# Patient Record
Sex: FEMALE | Race: BLACK OR AFRICAN AMERICAN | NOT HISPANIC OR LATINO | Employment: UNEMPLOYED | URBAN - METROPOLITAN AREA
[De-identification: names, ages, dates, MRNs, and addresses within clinical notes are randomized per-mention and may not be internally consistent; named-entity substitution may affect disease eponyms.]

---

## 2023-12-14 DIAGNOSIS — M25.562 ACUTE PAIN OF LEFT KNEE: Primary | ICD-10-CM

## 2023-12-14 DIAGNOSIS — M17.0 PRIMARY OSTEOARTHRITIS OF BOTH KNEES: ICD-10-CM

## 2024-01-02 ENCOUNTER — HOSPITAL ENCOUNTER (OUTPATIENT)
Dept: RADIOLOGY | Facility: HOSPITAL | Age: 59
Discharge: HOME | End: 2024-01-02
Attending: NURSE PRACTITIONER
Payer: MEDICAID

## 2024-01-02 ENCOUNTER — OFFICE VISIT (OUTPATIENT)
Dept: ORTHOPEDICS | Facility: CLINIC | Age: 59
End: 2024-01-02
Payer: MEDICAID

## 2024-01-02 VITALS
WEIGHT: 197.19 LBS | DIASTOLIC BLOOD PRESSURE: 98 MMHG | HEART RATE: 81 BPM | RESPIRATION RATE: 18 BRPM | HEIGHT: 64 IN | BODY MASS INDEX: 33.66 KG/M2 | SYSTOLIC BLOOD PRESSURE: 152 MMHG | TEMPERATURE: 98 F | OXYGEN SATURATION: 98 %

## 2024-01-02 DIAGNOSIS — M17.12 PRIMARY OSTEOARTHRITIS OF LEFT KNEE: Primary | ICD-10-CM

## 2024-01-02 DIAGNOSIS — M25.562 ACUTE PAIN OF LEFT KNEE: ICD-10-CM

## 2024-01-02 PROCEDURE — 3008F BODY MASS INDEX DOCD: CPT | Mod: CPTII,,, | Performed by: NURSE PRACTITIONER

## 2024-01-02 PROCEDURE — 99214 OFFICE O/P EST MOD 30 MIN: CPT | Mod: PBBFAC,25 | Performed by: NURSE PRACTITIONER

## 2024-01-02 PROCEDURE — 73564 X-RAY EXAM KNEE 4 OR MORE: CPT | Mod: TC,LT

## 2024-01-02 PROCEDURE — 99204 OFFICE O/P NEW MOD 45 MIN: CPT | Mod: 25,S$PBB,, | Performed by: NURSE PRACTITIONER

## 2024-01-02 PROCEDURE — 3080F DIAST BP >= 90 MM HG: CPT | Mod: CPTII,,, | Performed by: NURSE PRACTITIONER

## 2024-01-02 PROCEDURE — 1160F RVW MEDS BY RX/DR IN RCRD: CPT | Mod: CPTII,,, | Performed by: NURSE PRACTITIONER

## 2024-01-02 PROCEDURE — 1159F MED LIST DOCD IN RCRD: CPT | Mod: CPTII,,, | Performed by: NURSE PRACTITIONER

## 2024-01-02 PROCEDURE — 20610 DRAIN/INJ JOINT/BURSA W/O US: CPT | Mod: PBBFAC | Performed by: NURSE PRACTITIONER

## 2024-01-02 PROCEDURE — 3077F SYST BP >= 140 MM HG: CPT | Mod: CPTII,,, | Performed by: NURSE PRACTITIONER

## 2024-01-02 RX ORDER — DICLOFENAC SODIUM 10 MG/G
GEL TOPICAL
COMMUNITY
Start: 2023-08-30

## 2024-01-02 RX ORDER — LIDOCAINE HYDROCHLORIDE 10 MG/ML
5 INJECTION, SOLUTION EPIDURAL; INFILTRATION; INTRACAUDAL; PERINEURAL
Status: COMPLETED | OUTPATIENT
Start: 2024-01-02 | End: 2024-01-02

## 2024-01-02 RX ORDER — SERTRALINE HYDROCHLORIDE 50 MG/1
50 TABLET, FILM COATED ORAL
COMMUNITY
Start: 2023-11-28

## 2024-01-02 RX ORDER — TRIAMCINOLONE ACETONIDE 40 MG/ML
40 INJECTION, SUSPENSION INTRA-ARTICULAR; INTRAMUSCULAR
Status: COMPLETED | OUTPATIENT
Start: 2024-01-02 | End: 2024-01-02

## 2024-01-02 RX ORDER — LATANOPROST 50 UG/ML
1 SOLUTION/ DROPS OPHTHALMIC NIGHTLY
COMMUNITY
Start: 2023-12-20

## 2024-01-02 RX ORDER — OLMESARTAN MEDOXOMIL 20 MG/1
20 TABLET ORAL
COMMUNITY
Start: 2023-12-31

## 2024-01-02 RX ORDER — BRIMONIDINE TARTRATE, TIMOLOL MALEATE 2; 5 MG/ML; MG/ML
1 SOLUTION/ DROPS OPHTHALMIC 2 TIMES DAILY
COMMUNITY
Start: 2023-12-13

## 2024-01-02 RX ADMIN — LIDOCAINE HYDROCHLORIDE 5 ML: 10 INJECTION, SOLUTION EPIDURAL; INFILTRATION; INTRACAUDAL; PERINEURAL at 12:01

## 2024-01-02 RX ADMIN — TRIAMCINOLONE ACETONIDE 40 MG: 40 INJECTION, SUSPENSION INTRA-ARTICULAR; INTRAMUSCULAR at 12:01

## 2024-01-02 NOTE — PROGRESS NOTES
"  Subjective:   PATIENT ID: Annamarie Blackmon is a 58 y.o. female. Non-smoker. Employment HX: teacher, currently employed.    Seen [facility] ortho for same DX since n/a.   CHIEF COMPLAINT: Pain and Swelling of the Left Knee (Left knee pain x2 months, pain 6/10 at  present takes tylenol arthritis as needed for pain)    HPI:    Left aching medial knee pain.   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  Onset: several years ago fluctuates   Modifying Factors: worse with activity, improves with rest, stiffness after immobilization, and improves with less than 30 minutes activity  Associated Symptoms: crepitus, decreased ROM, and swelling   Activity: sedentary with light activity and pain moderately interferes with ADLs   Previous Treatments:  OTC pain relievers: acetaminophen, ibuprofen   and RX medications: topical diclofenac  without symptom relief  PMH: negative for contraindications for NSAID use  Family History: + OA    NOTE: New patient referred for left knee pain with limited conservative treatments.  Symptoms affecting ADLs and ability to work.     Current Outpatient Medications:     COMBIGAN 0.2-0.5 % Drop, Place 1 drop into both eyes 2 (two) times daily., Disp: , Rfl:     latanoprost 0.005 % ophthalmic solution, Place 1 drop into both eyes every evening., Disp: , Rfl:     olmesartan (BENICAR) 20 MG tablet, Take 20 mg by mouth., Disp: , Rfl:     sertraline (ZOLOFT) 50 MG tablet, Take 50 mg by mouth., Disp: , Rfl:     diclofenac sodium (VOLTAREN) 1 % Gel, Apply topically., Disp: , Rfl:   Review of patient's allergies indicates:   Allergen Reactions    Codeine Blisters     No results found for: "HGBA1C"   Body mass index is 33.85 kg/m².   Vitals:    01/02/24 1239   BP: (!) 152/98   Pulse: 81   Resp: 18   Temp: 97.8 °F (36.6 °C)   SpO2: 98%   Weight: 89.4 kg (197 lb 3.2 oz)   Height: 5' 4" (1.626 m)   PainSc:   6      REVIEW OF SYSTEMS:  A ten-point review of systems was performed and is " negative, except as mentioned above   Objective:   MSK Knee Exam  General:  no apparent distress, no pain indicators,  obesity  Inspection:   lower extremities in proportion with overall body habitus, no erythema   , limping gait w/ full weight bearing   LEFT KNEE RIGHT KNEE   moderate knee effusion,  no varus deformity, no contusion, no masses, no scars no knee effusion, normal alignment, no contusion, no masses, no scars   Palpation:     LEFT KNEE RIGHT KNEE   normal temperature, noted tenderness over the medial joint line, noted patellar grind with patella compression and mild patellar facet pain, mild  crepitus, mild quad deconditioning, no active mechanical locking noted w/ joint movement,  no tenderness of patellar tendon or tibial plateau, noted fullness noted to popliteal bursa  normal temperature, no tenderness noted w/ palpation, no patellar grind with patella compression and no patellar facet pain, no crepitus, no quad deconditioning, no active mechanical locking noted w/ joint movement,  no tenderness of patellar tendon or tibial plateau, no fullness noted to popliteal bursa      ROM Active Flexion / Extension (0-140)  Left 3 / 113 w/ pain Right 0 / 135 w/o pain   Strength Flexion / Extension (5 / 5)  Left 4 / 4 Right 5 / 5     Special Testing:         IT Band Syndrome L+ L-- R+ R-- Not Tested    Kg's Test [] [x] [] [x] []    Joint Effusion         Ballotable Effusion [x] [] [] [x] []    Fluid Wave [x] [] [] [x] []    Patellar Testing         Apprehension [] [x] [] [x] []    Meniscal Injury         Fabiola's Test [x] [] [] [x] []    Thessaly's Test [] [] [] [] [x]    Ligament Injury         ACL Anterior Drawer [] [x] [] [x] []    PCL Posterior Drawer [] [x] [] [x] []    LCL Varus Test [] [x] [] [x] []    MCL Valgus Test [] [x] [] [x] []      Hip Exam normal  Ankle Exam normal  Neurovascular: Intact to light touch  Neuro/ Psych: Awake, alert, oriented, normal mood and affect  Lymphatic: No  LAD  Assessment:   IMAGING: X-ray 4 views of left knee ordered, reviewed and independently interpreted by me. Discussed with patient. Noted no acute findings, DJD noted, awaiting radiologist findings    EMR REVIEW: completed with noted Referral documentation reviewed    DIAGNOSIS:  1. Primary osteoarthritis of left knee    2. BMI 33.0-33.9,adult       Plan:     Orders Placed This Encounter    X-Ray Knee Complete 4 or More Views Left     Ongoing education about DX and treatment recommendations including conservative treatments of daily HEP with TheraBand, BMI reduction goal 5-10% of body weight (180# overall goal ), muscle strengthening with use of stationary bike (RPM set at 80 or > with slow progression to goal of 40 minutes 3-4 times per week as tolerated), adequate vit D/C, glucosamine 1500 mg/day and daily acetaminophen 1000 mg 3 times/ day if able to tolerate.    Treatment plan: Moderate exacerbation of chronic Left Mild-to-moderate knee arthritis complicated by possible internal derangement Recommend starting initial conservative treatments including: soft knee splint, HEP with TheraBand > 6 weeks, CSI, and drainage.  If inadequate at f/u will consider formal RX PT.   Procedure: CSI v. VS injections discussed, s/t failed conservative treatments patient consents to CSI today & drainage  RX Medications: continue medications as RX per PCP.  RTC: 4 months  NOTE: None       Gwendolyn Carlsonsruddy [facility]  Procedure Note:   Large Joint Aspiration/Injection: L knee    Date/Time: 1/2/2024 12:20 PM    Performed by: Gwendolyn Esqueda NP  Authorized by: Gwendolyn Esqueda NP    Location:  Knee  Site:  L knee  Medications:  5 mL LIDOCAINE 1% (PF) 50 mg / 5 mL; 5 mL LIDOCAINE 1% (PF) 50 mg / 5 mL; 40 mg KENALOG 40 mg / 1 mL     Ortho Procedure Note   Procedure: LEFT Knee CSI lateral suprapatellar approach     Indications: Therapeutic Indication - Decrease pain, Increase range  of motion and improve quality of life:   Risks:  Possible complications with the injection include bleeding, infection (.01%), tendon rupture, steroid flare, fat pad or soft tissue atrophy, skin depigmentation, allergic reaction to medications and vasovagal response. (steroid flare treatment is rest, ice, NSAIDs and resolves in 24-36 hours)  Consent:  Procedure, risks, benefits, and alternatives explained the patient, who voiced understanding and agreed to proceed with procedure.  Consent signed and scanned into the medical record.   No absolute contraindications (cellulitis overlying joint, infection, lack of informed consent, allergy to injection medication, AVN protein or egg allergy for sodium hyaluronate, or history of steroid flare) or relative contraindications (uncontrolled DM2 A1c>10, coagulopathy, INR > 3.5, previous joint replacement or history of AVN).        Staff: Gwendolyn LEMOS  Description:  Time-out performed.  The patient was prepped in normal sterile fashion use of chlorhexidine scrub and the appropriate and anatomic landmarks were identified.  Sterile 21 gauge 1.5 inch  was used. Topical ethyl chloride was applied. Contents of syringe included:     LEFT KNEE: 5 ml of 1% of lidocaine (PF) pre-drainage, 5 ml of 1% of lidocaine (PF) with 40 mg of Kenalog post-drainage      Drainage: 20 ml clear yellow fluid removed from joint   Complications: None   EBL: None   Post Procedure: Patient alert, and moving all extremities. ROM improved, pain decreased.  Good peripheral pulses, no signs of vascular compromise and range of motion intact.  Aftercare instructions were given to patient at time of discharge.  Relative rest for 3 days-avoiding excess activity.  Place ice on the area for 15 minutes every 4-6 hours. Patient may take Tylenol a 1000 mg b.i.d. or ibuprofen 600 mg t.i.d. for the next 3-4 days if not on medication already and safe to take pending co-morbidities.  Protect the area for the  next 1-8 hours if anesthetic was used.  Avoid excessive activity for the next 3-4 weeks.  ER precautions given for fever, severe joint pain or allergic reaction or other new symptoms related to the joint injection.         Time Based Billing   Total Time Spent with Patient: 45 minutes Excludes Time Spent Performing Procedure  Visit Start Time: 1310  10 minutes spent prior to visit reviewing EMR, prior labs and x-rays.  25 minutes spent in visit with patient face-to-face time completing exam, obtaining history, educating on DX and treatment plan.  10 minutes spent after visit completing EMR documentation.   Visit End Time: 7505    Please be aware that this note has been generated with the assistance of [facility] voice-to-text.  Please excuse any spelling or grammatical errors.

## 2024-01-19 DIAGNOSIS — H40.10X0 OPEN-ANGLE GLAUCOMA, UNSPECIFIED GLAUCOMA STAGE, UNSPECIFIED LATERALITY, UNSPECIFIED OPEN-ANGLE GLAUCOMA TYPE: Primary | ICD-10-CM

## 2024-04-23 ENCOUNTER — OFFICE VISIT (OUTPATIENT)
Dept: OPHTHALMOLOGY | Facility: CLINIC | Age: 59
End: 2024-04-23
Payer: MEDICAID

## 2024-04-23 ENCOUNTER — TELEPHONE (OUTPATIENT)
Dept: ORTHOPEDICS | Facility: CLINIC | Age: 59
End: 2024-04-23
Payer: MEDICAID

## 2024-04-23 VITALS — BODY MASS INDEX: 33.12 KG/M2 | HEIGHT: 64 IN | WEIGHT: 194 LBS

## 2024-04-23 DIAGNOSIS — H40.10X0 OPEN-ANGLE GLAUCOMA, UNSPECIFIED GLAUCOMA STAGE, UNSPECIFIED LATERALITY, UNSPECIFIED OPEN-ANGLE GLAUCOMA TYPE: ICD-10-CM

## 2024-04-23 DIAGNOSIS — H40.053 BILATERAL OCULAR HYPERTENSION: Primary | ICD-10-CM

## 2024-04-23 DIAGNOSIS — H25.13 NUCLEAR SCLEROSIS OF BOTH EYES: ICD-10-CM

## 2024-04-23 PROCEDURE — 92133 CPTRZD OPH DX IMG PST SGM ON: CPT | Mod: PBBFAC,PN | Performed by: OPHTHALMOLOGY

## 2024-04-23 PROCEDURE — 92133 CPTRZD OPH DX IMG PST SGM ON: CPT | Mod: PBBFAC,PN

## 2024-04-23 PROCEDURE — 99213 OFFICE O/P EST LOW 20 MIN: CPT | Mod: PBBFAC,PN,25

## 2024-04-23 PROCEDURE — 92083 EXTENDED VISUAL FIELD XM: CPT | Mod: PBBFAC,PN | Performed by: OPHTHALMOLOGY

## 2024-04-23 PROCEDURE — 92083 EXTENDED VISUAL FIELD XM: CPT | Mod: PBBFAC,PN

## 2024-04-23 RX ORDER — BRIMONIDINE TARTRATE, TIMOLOL MALEATE 2; 5 MG/ML; MG/ML
1 SOLUTION/ DROPS OPHTHALMIC 2 TIMES DAILY
Qty: 5 ML | Refills: 11 | Status: SHIPPED | OUTPATIENT
Start: 2024-04-23

## 2024-04-23 RX ORDER — LATANOPROST 50 UG/ML
1 SOLUTION/ DROPS OPHTHALMIC NIGHTLY
Qty: 2.5 ML | Refills: 11 | Status: SHIPPED | OUTPATIENT
Start: 2024-04-23

## 2024-04-23 NOTE — PROGRESS NOTES
HPI    OCT, IOP and OCT  Referred from [facility]  Fluro drops give irritation   Last edited by Li Caceres MA on 4/23/2024  8:19 AM.            Assessment /Plan     For exam results, see Encounter Report.    Bilateral ocular hypertension    Open-angle glaucoma, unspecified glaucoma stage, unspecified laterality, unspecified open-angle glaucoma type    Nuclear sclerosis of both eyes           OCT RNFL  04/23/24  OD: 95 all green  OS: 112 white S rest green    HVF 24-2  04/23/24  OD: FL 0/10, 3% FP, 0% FN, reliable with ?SNS  OS: FL 3/11, 12% FP, 0% FN, mostly reliable and full    OCT mac   04/23/24: NFC no SRF/IRF OU        OHTN OU  - Referred from [facility], was followed as low risk glaucoma suspect  - on Combigan BID OU, Latanoprost QHS OU  - S/p SLT OU in 2021  - endorses compliance with drops, but missed combigan this AM  - IOP at presentation 30 // 25  - CDR 0.5 // 0.4 with healthy rim OU  - OCT RNFL all green OD // white S rest green OS   - HVF full with ?SNS OD // borderline reliable and full OS  - no correlating thinning on OCT or fundus exam, unlikely to be glaucomatous  - will need CCT, gonioscopy at next appt  - ctm on current therapy, IOP check in 2 months. 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 Phan in 2018  - on refresh drops PRN, doing well, K's clear  - monitor      Combined cataracts OU  - NVS  - 20/20 // 20/30 uncorrected  - monitor        RTC 2 months IOP, CCT, Gonio

## 2024-04-23 NOTE — LETTER
April 23, 2024      [facility]  [address] [place] [address]  Phone: [phone]       Patient: Annamarie Blackmon   YOB: 1965  Date of Visit: 04/23/2024    To Whom It May Concern:    Maria T Blackmon  was at [facility] on 04/23/2024. The patient may return to work/school on 04/24/24 with no restrictions. If you have any questions or concerns, or if I can be of further assistance, please do not hesitate to contact me.    Sincerely,    Lico Perez MD

## 2024-04-23 NOTE — TELEPHONE ENCOUNTER
Patient called stating that she is in a lot pain and having swelling would like to come in to be seen earlier if she can.    Moved patient to come in on 4/29/24

## 2024-04-29 ENCOUNTER — OFFICE VISIT (OUTPATIENT)
Dept: ORTHOPEDICS | Facility: CLINIC | Age: 59
End: 2024-04-29
Payer: MEDICAID

## 2024-04-29 ENCOUNTER — TELEPHONE (OUTPATIENT)
Dept: ORTHOPEDICS | Facility: CLINIC | Age: 59
End: 2024-04-29

## 2024-04-29 VITALS
HEART RATE: 78 BPM | SYSTOLIC BLOOD PRESSURE: 123 MMHG | DIASTOLIC BLOOD PRESSURE: 83 MMHG | TEMPERATURE: 98 F | BODY MASS INDEX: 33.2 KG/M2 | HEIGHT: 64 IN | WEIGHT: 194.44 LBS

## 2024-04-29 DIAGNOSIS — M25.462 EFFUSION OF LEFT KNEE: ICD-10-CM

## 2024-04-29 DIAGNOSIS — M17.12 PRIMARY OSTEOARTHRITIS OF LEFT KNEE: Primary | ICD-10-CM

## 2024-04-29 DIAGNOSIS — M23.92 INTERNAL DERANGEMENT OF LEFT KNEE: ICD-10-CM

## 2024-04-29 PROCEDURE — 3079F DIAST BP 80-89 MM HG: CPT | Mod: CPTII,,, | Performed by: NURSE PRACTITIONER

## 2024-04-29 PROCEDURE — 99214 OFFICE O/P EST MOD 30 MIN: CPT | Mod: PBBFAC,25 | Performed by: NURSE PRACTITIONER

## 2024-04-29 PROCEDURE — 4010F ACE/ARB THERAPY RXD/TAKEN: CPT | Mod: CPTII,,, | Performed by: NURSE PRACTITIONER

## 2024-04-29 PROCEDURE — 1160F RVW MEDS BY RX/DR IN RCRD: CPT | Mod: CPTII,,, | Performed by: NURSE PRACTITIONER

## 2024-04-29 PROCEDURE — 99214 OFFICE O/P EST MOD 30 MIN: CPT | Mod: 25,S$PBB,, | Performed by: NURSE PRACTITIONER

## 2024-04-29 PROCEDURE — 3008F BODY MASS INDEX DOCD: CPT | Mod: CPTII,,, | Performed by: NURSE PRACTITIONER

## 2024-04-29 PROCEDURE — 1159F MED LIST DOCD IN RCRD: CPT | Mod: CPTII,,, | Performed by: NURSE PRACTITIONER

## 2024-04-29 PROCEDURE — 20610 DRAIN/INJ JOINT/BURSA W/O US: CPT | Mod: PBBFAC | Performed by: NURSE PRACTITIONER

## 2024-04-29 PROCEDURE — 3074F SYST BP LT 130 MM HG: CPT | Mod: CPTII,,, | Performed by: NURSE PRACTITIONER

## 2024-04-29 RX ORDER — TRIAMCINOLONE ACETONIDE 40 MG/ML
40 INJECTION, SUSPENSION INTRA-ARTICULAR; INTRAMUSCULAR
Status: COMPLETED | OUTPATIENT
Start: 2024-04-29 | End: 2024-04-29

## 2024-04-29 RX ORDER — LIDOCAINE HYDROCHLORIDE 10 MG/ML
5 INJECTION, SOLUTION EPIDURAL; INFILTRATION; INTRACAUDAL; PERINEURAL
Status: COMPLETED | OUTPATIENT
Start: 2024-04-29 | End: 2024-04-29

## 2024-04-29 RX ORDER — MELOXICAM 15 MG/1
15 TABLET ORAL DAILY
Qty: 30 TABLET | Refills: 0 | Status: SHIPPED | OUTPATIENT
Start: 2024-04-29 | End: 2024-05-29

## 2024-04-29 RX ADMIN — LIDOCAINE HYDROCHLORIDE 5 ML: 10 INJECTION, SOLUTION EPIDURAL; INFILTRATION; INTRACAUDAL; PERINEURAL at 07:04

## 2024-04-29 RX ADMIN — TRIAMCINOLONE ACETONIDE 40 MG: 40 INJECTION, SUSPENSION INTRA-ARTICULAR; INTRAMUSCULAR at 07:04

## 2024-04-29 NOTE — PROGRESS NOTES
Subjective:   PATIENT ID: Annamarie Blackmon is a 58 y.o. female. Non-smoker. Employment HX: teacher, currently employed.    Seen [facility] ortho for same DX since n/a.   CHIEF COMPLAINT: Knee Pain of the Left Knee (Here With Lt knee pain. Pain Level 8 )    HPI:    Left aching medial knee pain.   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  Onset: several years ago fluctuates   Modifying Factors: worse with activity, improves with rest, stiffness after immobilization, and improves with less than 30 minutes activity  Associated Symptoms: crepitus, decreased ROM, and swelling   Activity: sedentary with light activity and pain moderately interferes with ADLs   Previous Treatments:  BMI reduction ongoing education, HEP withTheraBand, OTC pain relievers: Tylenol, ibuprofen, RX medications: topical diclofenac, and CSI since 2023 last injection 1/2/24  with adequate relief but symptoms returning  PMH: negative for contraindications for NSAID use  Family History: + OA    NOTE: Follow up, seen by me since 1/2/24  for left knee pain and swelling.  Conservative treatments providing adequate relief at this time and is not interested in surgical treatment options at this time.  CSI and drainage with HEP given 1/2/24 with adequate relief, lasting 3-3.5 months.  Symptoms returning recently over past few weeks and are affecting ADLs.  Requesting CSI and drainage  today for symptom relief.       Current Outpatient Medications:     COMBIGAN 0.2-0.5 % Drop, Place 1 drop into both eyes 2 (two) times daily., Disp: 5 mL, Rfl: 11    latanoprost 0.005 % ophthalmic solution, Place 1 drop into both eyes every evening., Disp: 2.5 mL, Rfl: 11    olmesartan (BENICAR) 20 MG tablet, Take 20 mg by mouth., Disp: , Rfl:     sertraline (ZOLOFT) 50 MG tablet, Take 50 mg by mouth., Disp: , Rfl:     diclofenac sodium (VOLTAREN) 1 % Gel, Apply topically. (Patient not taking: Reported on 4/23/2024), Disp: , Rfl:     Current  "Facility-Administered Medications:     LIDOcaine (PF) 10 mg/ml (1%) injection 50 mg, 5 mL, Other, 1 time in Clinic/HOD,     LIDOcaine (PF) 10 mg/ml (1%) injection 50 mg, 5 mL, Other, 1 time in Clinic/HOD,     triamcinolone acetonide injection 40 mg, 40 mg, Intra-articular, 1 time in Clinic/HOD,   Review of patient's allergies indicates:   Allergen Reactions    Codeine Blisters     No results found for: "HGBA1C"   Body mass index is 33.38 kg/m².   Vitals:    04/29/24 0807 04/29/24 0810   BP: (!) 148/88 123/83   Pulse: 78    Temp: 98.2 °F (36.8 °C)    TempSrc: Oral    Weight: 88.2 kg (194 lb 7.1 oz)    Height: 5' 4" (1.626 m)    PainSc:   8       REVIEW OF SYSTEMS:  A ten-point review of systems was performed and is negative, except as mentioned above   Objective:   MSK Knee Exam  General:  no apparent distress, no pain indicators,  obesity  Inspection:   lower extremities in proportion with overall body habitus, no erythema   , limping gait w/ full weight bearing   LEFT KNEE RIGHT KNEE   moderate knee effusion,  no varus deformity, no contusion, no masses, no scars no knee effusion, normal alignment, no contusion, no masses, no scars   Palpation:     LEFT KNEE RIGHT KNEE   normal temperature, noted tenderness over the medial joint line, noted patellar grind with patella compression and mild patellar facet pain, mild  crepitus, mild quad deconditioning, no active mechanical locking noted w/ joint movement,  no tenderness of patellar tendon or tibial plateau, noted fullness noted to popliteal bursa  normal temperature, no tenderness noted w/ palpation, no patellar grind with patella compression and no patellar facet pain, no crepitus, no quad deconditioning, no active mechanical locking noted w/ joint movement,  no tenderness of patellar tendon or tibial plateau, no fullness noted to popliteal bursa      ROM Active Flexion / Extension (0-140)  Left 3 / 113 w/ pain Right 0 / 135 w/o pain   Strength Flexion / Extension " (5 / 5)  Left 4 / 4 Right 5 / 5     Special Testing:         IT Band Syndrome L+ L-- R+ R-- Not Tested    Kg's Test [] [x] [] [x] []    Joint Effusion         Ballotable Effusion [x] [] [] [x] []    Fluid Wave [x] [] [] [x] []    Patellar Testing         Apprehension [] [x] [] [x] []    Meniscal Injury         Fabiola's Test [x] [] [] [x] []    Thessaly's Test [] [] [] [] [x]    Ligament Injury         ACL Anterior Drawer [] [x] [] [x] []    PCL Posterior Drawer [] [x] [] [x] []    LCL Varus Test [] [x] [] [x] []    MCL Valgus Test [] [x] [] [x] []      Hip Exam normal  Ankle Exam normal  Neurovascular: Intact to light touch  Neuro/ Psych: Awake, alert, oriented, normal mood and affect  Lymphatic: No LAD  Assessment:   IMAGING: X-ray 4 views of left knee dated 1/2/24 reviewed and independently interpreted by me.  Discussed with patient. Noted no acute, DJD noted.    XR KNEE COMP 4 OR MORE VIEWS LEFT 1/2/24  CLINICAL HISTORY:  Pain in left knee  COMPARISON:  None.  FINDINGS:  No acute displaced fractures or dislocations.  There is some narrowing of the medial compartment of the knee joint with presence of marginal osteophytes articular spaces are otherwise preserved with smooth articular surfaces  No blastic or lytic lesions.  Soft tissues within normal limits.  Impression:  Degenerative changes.    EMR REVIEW: completed with noted prior visit 1/2/24 reviewed.    DIAGNOSIS:  1. Primary osteoarthritis of left knee    2. Effusion of left knee    3. Internal derangement of left knee    4. BMI 33.0-33.9,adult       Plan:     Orders Placed This Encounter    triamcinolone acetonide injection 40 mg    LIDOcaine (PF) 10 mg/ml (1%) injection 50 mg    LIDOcaine (PF) 10 mg/ml (1%) injection 50 mg     Ongoing education about DX and treatment recommendations including conservative treatments of daily HEP with TheraBand, BMI reduction goal 5-10% of body weight (180# overall goal ), muscle strengthening with use of stationary  bike (RPM set at 80 or > with slow progression to goal of 40 minutes 3-4 times per week as tolerated), adequate vit D/C, glucosamine 1500 mg/day and daily acetaminophen 1000 mg 3 times/ day if able to tolerate.    Treatment plan: Moderate exacerbation of chronic Left Mild-to-moderate knee arthritis complicated by possible internal derangement  Discussed with patient my recommendation for MRI due to recurrent effusion in order to definitely DX.  Patient declines at this time due to pain and swelling and up coming Mother's Day wants relief today.  She would rather CSI and drainage today and pursue MRI at a later time.     Procedure: CSI v. VS injections discussed, s/t failed conservative treatments patient consents to CSI today & drainage  RX Medications: continue medications as RX per PCP.  RTC: PRN if symptoms worsen or return  NOTE: None       Gwendolyn Carlsonsruddy [facility]  Procedure Note:   Large Joint Aspiration/Injection: L knee    Date/Time: 4/29/2024 7:40 AM    Performed by: Gwendolyn Esqueda NP  Authorized by: Gwendolyn Esqueda NP    Indications:  Arthritis and joint swelling  Location:  Knee  Site:  L knee  Medications:  5 mL LIDOCAINE 1% (PF) 50 mg / 5 mL; 5 mL LIDOCAINE 1% (PF) 50 mg / 5 mL; 40 mg KENALOG 40 mg / 1 mL     Ortho Procedure Note   Procedure: LEFT Knee CSI lateral suprapatellar approach     Indications: Therapeutic Indication - Decrease pain, Increase range of motion and improve quality of life:   Risks:  Possible complications with the injection include bleeding, infection (.01%), tendon rupture, steroid flare, fat pad or soft tissue atrophy, skin depigmentation, allergic reaction to medications and vasovagal response. (steroid flare treatment is rest, ice, NSAIDs and resolves in 24-36 hours)  Consent:  Procedure, risks, benefits, and alternatives explained the patient, who voiced understanding and agreed to proceed with procedure.  Consent  signed and scanned into the medical record.   No absolute contraindications (cellulitis overlying joint, infection, lack of informed consent, allergy to injection medication, AVN protein or egg allergy for sodium hyaluronate, or history of steroid flare) or relative contraindications (uncontrolled DM2 A1c>10, coagulopathy, INR > 3.5, previous joint replacement or history of AVN).        Staff: Gwendolyn LEMOS  Description:  Time-out performed.  The patient was prepped in normal sterile fashion use of chlorhexidine scrub and the appropriate and anatomic landmarks were identified.  Sterile 21 gauge 1.5 inch  was used. Topical ethyl chloride was applied. Contents of syringe included:     LEFT KNEE: 5 ml of 1% of lidocaine (PF) with 40 mg of Kenalog      Drainage: 30 ml clear yellow fluid removed from joint   Complications: None   EBL: None   Post Procedure: Patient alert, and moving all extremities. ROM improved, pain decreased.  Good peripheral pulses, no signs of vascular compromise and range of motion intact.  Aftercare instructions were given to patient at time of discharge.  Relative rest for 3 days-avoiding excess activity.  Place ice on the area for 15 minutes every 4-6 hours. Patient may take Tylenol a 1000 mg b.i.d. or ibuprofen 600 mg t.i.d. for the next 3-4 days if not on medication already and safe to take pending co-morbidities.  Protect the area for the next 1-8 hours if anesthetic was used.  Avoid excessive activity for the next 3-4 weeks.  ER precautions given for fever, severe joint pain or allergic reaction or other new symptoms related to the joint injection.         Time Based Billing   None    Please be aware that this note has been generated with the assistance of [employer] voice-to-text.  Please excuse any spelling or grammatical errors.

## 2024-04-29 NOTE — LETTER
April 29, 2024      [facility]  [address] [place] [address]  Phone: [phone]       Patient: Annamarie Blackmon   YOB: 1965  Date of Visit: 04/29/2024    To Whom It May Concern:    Maria T Blackmon  was at [facility] on 04/29/2024. The patient may return to work/school on 04/29/2024 with no restrictions. If you have any questions or concerns, or if I can be of further assistance, please do not hesitate to contact me.    Sincerely,    Gwendolyn Prince NP

## 2024-04-29 NOTE — TELEPHONE ENCOUNTER
Patient called to see when will her script be sent to the pharmacy. States she called/stopped by and it was not there. Please give patient a call back when script is sent.    [phone]

## 2024-06-10 ENCOUNTER — OFFICE VISIT (OUTPATIENT)
Dept: ORTHOPEDICS | Facility: CLINIC | Age: 59
End: 2024-06-10
Payer: MEDICAID

## 2024-06-10 VITALS — HEIGHT: 64 IN | WEIGHT: 189.38 LBS | BODY MASS INDEX: 32.33 KG/M2

## 2024-06-10 DIAGNOSIS — M17.12 PRIMARY OSTEOARTHRITIS OF LEFT KNEE: Primary | ICD-10-CM

## 2024-06-10 DIAGNOSIS — M23.92 INTERNAL DERANGEMENT OF KNEE JOINT, LEFT: ICD-10-CM

## 2024-06-10 PROCEDURE — 1159F MED LIST DOCD IN RCRD: CPT | Mod: CPTII,,, | Performed by: NURSE PRACTITIONER

## 2024-06-10 PROCEDURE — 99214 OFFICE O/P EST MOD 30 MIN: CPT | Mod: S$PBB,,, | Performed by: NURSE PRACTITIONER

## 2024-06-10 PROCEDURE — 4010F ACE/ARB THERAPY RXD/TAKEN: CPT | Mod: CPTII,,, | Performed by: NURSE PRACTITIONER

## 2024-06-10 PROCEDURE — 99213 OFFICE O/P EST LOW 20 MIN: CPT | Mod: PBBFAC | Performed by: NURSE PRACTITIONER

## 2024-06-10 PROCEDURE — 1160F RVW MEDS BY RX/DR IN RCRD: CPT | Mod: CPTII,,, | Performed by: NURSE PRACTITIONER

## 2024-06-10 PROCEDURE — 3008F BODY MASS INDEX DOCD: CPT | Mod: CPTII,,, | Performed by: NURSE PRACTITIONER

## 2024-06-10 NOTE — PROGRESS NOTES
"  Subjective:   PATIENT ID: Annamarie Blackmon is a 58 y.o. female. Non-smoker. Employment HX: teacher, currently employed.    Seen [facility] ortho for same DX since n/a.   CHIEF COMPLAINT: Knee Pain of the Left Knee (Here with Lt Knee pain . Pain level 7-8. Here To Possibly get MRI)    HPI:    Left aching medial knee pain.   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  Onset: several years ago fluctuates   Modifying Factors: worse with activity, improves with rest, stiffness after immobilization, and improves with less than 30 minutes activity  Associated Symptoms: crepitus, decreased ROM, and swelling   Activity: sedentary with light activity and pain moderately interferes with ADLs   Previous Treatments:  BMI reduction ongoing education, HEP withTheraBand, OTC pain relievers: Tylenol, ibuprofen, RX medications: topical diclofenac, and CSI since 2023 last injection 1/2/24  with adequate relief but symptoms returning  PMH: negative for contraindications for NSAID use  Family History: + OA    NOTE: Follow up, seen by me since 1/2/24  for left knee DJD and recurrent effusion.  Conservative treatments providing adequate relief at this time and is not interested in surgical treatment options at this time.  CSI and drainage with HEP given 4/29/24 with adequate relief, lasting 1.5-2 months.  Symptoms returning recently over past few weeks and are affecting ADLs.  Requesting surgical treatment options.     Current Outpatient Medications:     COMBIGAN 0.2-0.5 % Drop, Place 1 drop into both eyes 2 (two) times daily., Disp: 5 mL, Rfl: 11    latanoprost 0.005 % ophthalmic solution, Place 1 drop into both eyes every evening., Disp: 2.5 mL, Rfl: 11    olmesartan (BENICAR) 20 MG tablet, Take 20 mg by mouth., Disp: , Rfl:     sertraline (ZOLOFT) 50 MG tablet, Take 50 mg by mouth., Disp: , Rfl:   Review of patient's allergies indicates:   Allergen Reactions    Codeine Blisters     No results found for: "HGBA1C" " "  Body mass index is 32.51 kg/m².   Vitals:    06/10/24 1129 06/10/24 1130   Weight: 85.9 kg (189 lb 6 oz)    Height: 5' 4" (1.626 m)    PainSc:    7      REVIEW OF SYSTEMS:  A ten-point review of systems was performed and is negative, except as mentioned above   Objective:   MSK Knee Exam  General:  no apparent distress, no pain indicators,  obesity  Inspection:   lower extremities in proportion with overall body habitus, no erythema   , limping gait w/ full weight bearing   LEFT KNEE RIGHT KNEE   moderate knee effusion,  no varus deformity, no contusion, no masses, no scars no knee effusion, normal alignment, no contusion, no masses, no scars   Palpation:     LEFT KNEE RIGHT KNEE   normal temperature, noted tenderness over the medial joint line, noted patellar grind with patella compression and mild patellar facet pain, mild  crepitus, mild quad deconditioning, no active mechanical locking noted w/ joint movement,  no tenderness of patellar tendon or tibial plateau, noted fullness noted to popliteal bursa  normal temperature, no tenderness noted w/ palpation, no patellar grind with patella compression and no patellar facet pain, no crepitus, no quad deconditioning, no active mechanical locking noted w/ joint movement,  no tenderness of patellar tendon or tibial plateau, no fullness noted to popliteal bursa      ROM Active Flexion / Extension (0-140)  Left 3 / 113 w/ pain Right 0 / 135 w/o pain   Strength Flexion / Extension (5 / 5)  Left 4 / 4 Right 5 / 5     Special Testing:         IT Band Syndrome L+ L-- R+ R-- Not Tested    Kg's Test [] [x] [] [x] []    Joint Effusion         Ballotable Effusion [x] [] [] [x] []    Fluid Wave [x] [] [] [x] []    Patellar Testing         Apprehension [] [x] [] [x] []    Meniscal Injury         Fabiola's Test [x] [] [] [x] []    Thessaly's Test [] [] [] [] [x]    Ligament Injury         ACL Anterior Drawer [] [x] [] [x] []    PCL Posterior Drawer [] [x] [] [x] []    LCL Varus " Test [] [x] [] [x] []    MCL Valgus Test [] [x] [] [x] []      Hip Exam normal  Ankle Exam normal  Neurovascular: Intact to light touch  Neuro/ Psych: Awake, alert, oriented, normal mood and affect  Lymphatic: No LAD  Assessment:   IMAGING: X-ray 4 views of left knee dated 1/2/24 reviewed and independently interpreted by me.  Discussed with patient. Noted no acute, DJD noted.    XR KNEE COMP 4 OR MORE VIEWS LEFT 1/2/24  CLINICAL HISTORY:  Pain in left knee  COMPARISON:  None.  FINDINGS:  No acute displaced fractures or dislocations.  There is some narrowing of the medial compartment of the knee joint with presence of marginal osteophytes articular spaces are otherwise preserved with smooth articular surfaces  No blastic or lytic lesions.  Soft tissues within normal limits.  Impression:  Degenerative changes.    EMR REVIEW: completed with noted prior visit 4/29/24 reviewed.    DIAGNOSIS:  1. Primary osteoarthritis of left knee    2. BMI 32.0-32.9,adult      Plan:      Ongoing education about DX and treatment recommendations including conservative treatments of daily HEP with TheraBand, BMI reduction goal 5-10% of body weight (180# overall goal ), muscle strengthening with use of stationary bike (RPM set at 80 or > with slow progression to goal of 40 minutes 3-4 times per week as tolerated), adequate vit D/C, glucosamine 1500 mg/day and daily acetaminophen 1000 mg 3 times/ day if able to tolerate.    Treatment plan: Moderate exacerbation of chronic Left Mild-to-moderate knee arthritis complicated by possible internal derangement MRI ordered s/t failed conservative treatments including: RX NSAID, formal RX PT, CSI, and HEP > 3 months with inadequate relief.  Patient continues with findings suggestive of meniscal injury despite > 6 weeks treatment.  MRI required for definitive DX and possible surgical treatment plans.   Procedure: n/a   RX Medications: continue medications as RX per PCP.  RTC: after imaging  complete  NOTE: None       Leah Menard-Neumann FNP [facility]  Procedure Note:   None  Time Based Billing   None    Please be aware that this note has been generated with the assistance of [email or web address] voice-to-text.  Please excuse any spelling or grammatical errors.

## 2024-06-21 ENCOUNTER — HOSPITAL ENCOUNTER (OUTPATIENT)
Dept: RADIOLOGY | Facility: HOSPITAL | Age: 59
Discharge: HOME | End: 2024-06-21
Attending: NURSE PRACTITIONER
Payer: MEDICAID

## 2024-06-21 DIAGNOSIS — M23.92 INTERNAL DERANGEMENT OF KNEE JOINT, LEFT: ICD-10-CM

## 2024-06-21 PROCEDURE — 73721 MRI JNT OF LWR EXTRE W/O DYE: CPT | Mod: TC,LT

## 2024-07-01 ENCOUNTER — OFFICE VISIT (OUTPATIENT)
Dept: OPHTHALMOLOGY | Facility: CLINIC | Age: 59
End: 2024-07-01
Payer: MEDICAID

## 2024-07-01 VITALS — WEIGHT: 189.38 LBS | HEIGHT: 64 IN | BODY MASS INDEX: 32.33 KG/M2

## 2024-07-01 DIAGNOSIS — H25.813 COMBINED FORMS OF AGE-RELATED CATARACT OF BOTH EYES: ICD-10-CM

## 2024-07-01 DIAGNOSIS — H40.053 BILATERAL OCULAR HYPERTENSION: Primary | ICD-10-CM

## 2024-07-01 DIAGNOSIS — H04.123 DRY EYE SYNDROME OF BOTH EYES: ICD-10-CM

## 2024-07-01 PROCEDURE — 92020 GONIOSCOPY: CPT | Mod: PBBFAC,PN

## 2024-07-01 PROCEDURE — 76514 ECHO EXAM OF EYE THICKNESS: CPT | Mod: PBBFAC,PN

## 2024-07-01 PROCEDURE — 99213 OFFICE O/P EST LOW 20 MIN: CPT | Mod: PBBFAC,PN

## 2024-07-01 RX ORDER — MELOXICAM 15 MG/1
15 TABLET ORAL DAILY
COMMUNITY
Start: 2024-06-04

## 2024-07-01 NOTE — PROGRESS NOTES
HPI     Eye Exam     Additional comments: CCT, OCP Ck           Comments    Bilateral ocular hypertension            Last edited by Sanjuanita Godwin LPN on 7/1/2024  2:58 PM.        Assessment /Plan     OCT RNFL  04/23/24  OD: 95 all green  OS: 112 white S rest green    HVF 24-2  04/23/24  OD: FL 0/10, 3% FP, 0% FN, reliable with ?SNS  OS: FL 3/11, 12% FP, 0% FN, mostly reliable and full    OCT mac   04/23/24: NFC no SRF/IRF OU      1. OHTN OU  - Referred from [facility], was followed as low risk glaucoma suspect  - Gonio 7/1/24: Open to SS   - CCT 7/1/24: 518//545 (pt with hx of LASIK OD)  - S/p SLT OU in 2021  - Tmax 30 // 25 (on combigan, latanoprost)  - CDR 0.5 // 0.4 with healthy rim OU  - OCT RNFL all green OD // white S rest green OS   - HVF full with ?SNS OD // borderline reliable and full OS  - No correlating thinning on OCT or fundus exam, unlikely to be glaucomatous  - 7/1/24: On combigan and latanoprost, IOP 20//16, continue drops    2. 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 Phan in 2018  - on refresh drops PRN, doing well, K's clear  - monitor    3. Combined form cataracts OU  - NVS  - 20/20 // 20/30 uncorrected  - monitor        RTC 4-6 months IOP check

## 2024-07-03 ENCOUNTER — OFFICE VISIT (OUTPATIENT)
Dept: ORTHOPEDICS | Facility: CLINIC | Age: 59
End: 2024-07-03
Payer: MEDICAID

## 2024-07-03 VITALS
HEIGHT: 64 IN | BODY MASS INDEX: 32.63 KG/M2 | HEART RATE: 98 BPM | SYSTOLIC BLOOD PRESSURE: 137 MMHG | WEIGHT: 191.13 LBS | TEMPERATURE: 98 F | DIASTOLIC BLOOD PRESSURE: 88 MMHG

## 2024-07-03 DIAGNOSIS — M23.204 OTHER OLD TEAR OF MEDIAL MENISCUS OF LEFT KNEE: Primary | ICD-10-CM

## 2024-07-03 DIAGNOSIS — M25.462 EFFUSION OF LEFT KNEE: ICD-10-CM

## 2024-07-03 PROCEDURE — 1159F MED LIST DOCD IN RCRD: CPT | Mod: CPTII,,, | Performed by: NURSE PRACTITIONER

## 2024-07-03 PROCEDURE — 99214 OFFICE O/P EST MOD 30 MIN: CPT | Mod: PBBFAC | Performed by: NURSE PRACTITIONER

## 2024-07-03 PROCEDURE — 99214 OFFICE O/P EST MOD 30 MIN: CPT | Mod: S$PBB,,, | Performed by: NURSE PRACTITIONER

## 2024-07-03 PROCEDURE — 3079F DIAST BP 80-89 MM HG: CPT | Mod: CPTII,,, | Performed by: NURSE PRACTITIONER

## 2024-07-03 PROCEDURE — 3008F BODY MASS INDEX DOCD: CPT | Mod: CPTII,,, | Performed by: NURSE PRACTITIONER

## 2024-07-03 PROCEDURE — 4010F ACE/ARB THERAPY RXD/TAKEN: CPT | Mod: CPTII,,, | Performed by: NURSE PRACTITIONER

## 2024-07-03 PROCEDURE — 3075F SYST BP GE 130 - 139MM HG: CPT | Mod: CPTII,,, | Performed by: NURSE PRACTITIONER

## 2024-07-03 PROCEDURE — 1160F RVW MEDS BY RX/DR IN RCRD: CPT | Mod: CPTII,,, | Performed by: NURSE PRACTITIONER

## 2024-07-03 NOTE — PROGRESS NOTES
Subjective:   PATIENT ID: Annamarie Blackmon is a 58 y.o. female. Non-smoker. Employment HX: teacher, currently employed.    Seen [facility] ortho for same DX since n/a.   CHIEF COMPLAINT: Knee Pain of the Left Knee (MRI Results Lt Knee. Pain Level 2-3 )    HPI:    Left aching medial knee pain.   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  Onset: several years ago fluctuates   Modifying Factors: worse with activity, improves with rest, stiffness after immobilization, and improves with less than 30 minutes activity  Associated Symptoms: crepitus, decreased ROM, and swelling   Activity: sedentary with light activity and pain moderately interferes with ADLs   Previous Treatments:  BMI reduction ongoing education, HEP withTheraBand, OTC pain relievers: Tylenol, ibuprofen, RX medications: topical diclofenac, and CSI since 2023 last injection 1/2/24  with adequate relief but symptoms returning  PMH: negative for contraindications for NSAID use  Family History: + OA    NOTE: Follow up, seen by me since 1/2/24  for left knee DJD and recurrent effusion.  Conservative treatments providing adequate relief at this time and is interested in surgical treatment options at this time.  CSI and drainage with HEP given 4/29/24 with adequate relief, but only lasting 1.5-2 months.  Symptoms returning recently over past few weeks and are affecting ADLs.  Requesting surgical treatment options.  Here today for MRI results.      Current Outpatient Medications:     COMBIGAN 0.2-0.5 % Drop, Place 1 drop into both eyes 2 (two) times daily., Disp: 5 mL, Rfl: 11    latanoprost 0.005 % ophthalmic solution, Place 1 drop into both eyes every evening., Disp: 2.5 mL, Rfl: 11    meloxicam (MOBIC) 15 MG tablet, Take 15 mg by mouth once daily., Disp: , Rfl:     olmesartan (BENICAR) 20 MG tablet, Take 20 mg by mouth., Disp: , Rfl:     sertraline (ZOLOFT) 50 MG tablet, Take 50 mg by mouth., Disp: , Rfl:   Review of patient's allergies  "indicates:   Allergen Reactions    Codeine Blisters     No results found for: "HGBA1C"   Body mass index is 32.81 kg/m².   Vitals:    07/03/24 1404 07/03/24 1405 07/03/24 1407   BP: (!) 147/93  137/88   Pulse: 98     Temp: 98 °F (36.7 °C)     TempSrc: Oral     Weight: 86.7 kg (191 lb 2.2 oz)     Height: 5' 4" (1.626 m)     PainSc:    3       REVIEW OF SYSTEMS:  A ten-point review of systems was performed and is negative, except as mentioned above   Objective:   MSK Knee Exam  General:  no apparent distress, no pain indicators,  obesity  Inspection:   lower extremities in proportion with overall body habitus, no erythema   , limping gait w/ full weight bearing   LEFT KNEE RIGHT KNEE   moderate knee effusion,  no varus deformity, no contusion, no masses, no scars no knee effusion, normal alignment, no contusion, no masses, no scars   Palpation:     LEFT KNEE RIGHT KNEE   normal temperature, noted tenderness over the medial joint line, noted patellar grind with patella compression and mild patellar facet pain, mild  crepitus, mild quad deconditioning, no active mechanical locking noted w/ joint movement,  no tenderness of patellar tendon or tibial plateau, noted fullness noted to popliteal bursa  normal temperature, no tenderness noted w/ palpation, no patellar grind with patella compression and no patellar facet pain, no crepitus, no quad deconditioning, no active mechanical locking noted w/ joint movement,  no tenderness of patellar tendon or tibial plateau, no fullness noted to popliteal bursa      ROM Active Flexion / Extension (0-140)  Left 3 / 111 w/ pain Right 0 / 135 w/o pain   Strength Flexion / Extension (5 / 5)  Left 4 / 4 Right 5 / 5     Special Testing:         IT Band Syndrome L+ L-- R+ R-- Not Tested    Kg's Test [] [x] [] [x] []    Joint Effusion         Ballotable Effusion [x] [] [] [x] []    Fluid Wave [x] [] [] [x] []    Patellar Testing         Apprehension [] [x] [] [x] []    Meniscal Injury      "    Fabiola's Test [x] [] [] [x] []    Thessaly's Test [] [] [] [] [x]    Ligament Injury         ACL Anterior Drawer [] [x] [] [x] []    PCL Posterior Drawer [] [x] [] [x] []    LCL Varus Test [] [x] [] [x] []    MCL Valgus Test [] [x] [] [x] []      Hip Exam normal  Ankle Exam normal  Neurovascular: Intact to light touch  Neuro/ Psych: Awake, alert, oriented, normal mood and affect  Lymphatic: No LAD  Assessment:   IMAGING: X-ray 4 views of left knee dated 1/2/24 reviewed and independently interpreted by me.  Discussed with patient. Noted no acute, DJD noted.    XR KNEE COMP 4 OR MORE VIEWS LEFT 1/2/24  CLINICAL HISTORY:  Pain in left knee  COMPARISON:  None.  FINDINGS:  No acute displaced fractures or dislocations.  There is some narrowing of the medial compartment of the knee joint with presence of marginal osteophytes articular spaces are otherwise preserved with smooth articular surfaces  No blastic or lytic lesions.  Soft tissues within normal limits.  Impression:  Degenerative changes.    EXAMINATION: MRI KNEE WITHOUT CONTRAST LEFT 6/21/24  CLINICAL HISTORY:  Knee pain, chronic, negative xray (Age >= 5y);Unspecified internal derangement of left knee  TECHNIQUE:  Multiplanar, multisequence images were performed about the left knee.  No contrast was administered.  COMPARISON:  Radiographs left knee used for comparison dated 01/02/2024  FINDINGS:  The cruciate ligaments are intact.  The medial collateral ligament is acutely inflamed.  Fibular collateral ligament and popliteus tendon are intact.  A tear is present to the body and posterior horn of the medial meniscus and demonstrates a dominant horizontal component and an unstable meniscal fragment extruded into the medial recess.  The lateral meniscus is extruded but does not appear discretely torn.  Grade 4 chondromalacia is present to the medial compartment and the anterior femoral trochlea.  A moderate-sized knee joint effusion is present.  Mild patella  [place].  The distance from the tibial tuberosity to the trochlear groove measures 10 mm.  The included muscles are intact.  Iliotibial band is unremarkable.  Impression:  A tear is present to the body and posterior horn of the medial meniscus and demonstrates a dominant horizontal component and an unstable meniscal fragment extruded into the medial recess.  The medial collateral ligament is acutely inflamed.  Grade 4 chondromalacia is present to the medial compartment.  A moderate-sized knee joint effusion is present.  Mild patella Makayla    EMR REVIEW: completed with noted prior visit 6/10/24 reviewed.    DIAGNOSIS:  1. Other old tear of medial meniscus of left knee    2. Effusion of left knee    3. BMI 32.0-32.9,adult      Plan:      Ongoing education about DX and treatment recommendations including conservative treatments of daily HEP with TheraBand, BMI reduction goal 5-10% of body weight (175# overall goal), muscle strengthening with use of stationary bike (RPM set at 80 or > with slow progression to goal of 40 minutes 3-4 times per week as tolerated), adequate vit D/C, glucosamine 1500 mg/day and daily acetaminophen 1000 mg 3 times/ day if able to tolerate.    Treatment plan: Moderate exacerbation of chronic Left Mild-to-moderate knee arthritis, with medial meniscal tear with unstable fragment and extrusion   Extended time spent discussing MRI findings and educating patient on treatment options.  Discussed surgical evaluation referral versus non-surgical treatments including RX PT and CSI for symptoms relief.  Patient desires surgical treatment options at this time.  Appointment with orthopedic surgeon residents for evaluation will be scheduled. Patient is aware I am not guaranteeing surgery. Determination of appropriateness for surgery will be made by surgeon at evaluation.    Procedure: n/a  RX Medications: continue medications as RX per PCP.  RTC: next available appt. with ortho res.   NOTE:  MRI, XR and HPI  reviewed with Dr. Medina with recommendation for ortho res. Eval for possible orthoscope.        Gwendolyn Carlsonsruddy [facility]  Procedure Note:   None  Time Based Billing   Total Time Spent with Patient: 30 minutes .  Visit Start Time: 1410  10 minutes spent prior to visit reviewing EMR, prior labs and x-rays.  10 minutes spent in visit with patient face-to-face time completing exam, obtaining history, educating on DX and treatment plan.  10 minutes spent after visit completing EMR documentation.   Visit End Time: 1440    Please be aware that this note has been generated with the assistance of MModal voice-to-text.  Please excuse any spelling or grammatical errors.

## 2024-07-15 ENCOUNTER — OFFICE VISIT (OUTPATIENT)
Dept: ORTHOPEDICS | Facility: CLINIC | Age: 59
End: 2024-07-15
Payer: MEDICAID

## 2024-07-15 VITALS — HEIGHT: 64 IN | WEIGHT: 191 LBS | BODY MASS INDEX: 32.61 KG/M2

## 2024-07-15 DIAGNOSIS — M17.12 PRIMARY OSTEOARTHRITIS OF LEFT KNEE: Primary | ICD-10-CM

## 2024-07-15 PROCEDURE — 99213 OFFICE O/P EST LOW 20 MIN: CPT | Mod: PBBFAC

## 2024-07-15 PROCEDURE — 4010F ACE/ARB THERAPY RXD/TAKEN: CPT | Mod: CPTII,,, | Performed by: STUDENT IN AN ORGANIZED HEALTH CARE EDUCATION/TRAINING PROGRAM

## 2024-07-15 PROCEDURE — 1159F MED LIST DOCD IN RCRD: CPT | Mod: CPTII,,, | Performed by: STUDENT IN AN ORGANIZED HEALTH CARE EDUCATION/TRAINING PROGRAM

## 2024-07-15 PROCEDURE — 3008F BODY MASS INDEX DOCD: CPT | Mod: CPTII,,, | Performed by: STUDENT IN AN ORGANIZED HEALTH CARE EDUCATION/TRAINING PROGRAM

## 2024-07-15 PROCEDURE — 99214 OFFICE O/P EST MOD 30 MIN: CPT | Mod: S$PBB,,, | Performed by: STUDENT IN AN ORGANIZED HEALTH CARE EDUCATION/TRAINING PROGRAM

## 2024-07-15 NOTE — PROGRESS NOTES
[facility]opa[facility] Clinic Note    In brief, Annamarie Blackmon is a 58 y.o. female presents to clinic for evaluation of left knee pain.  Patient reports for about 1 year she has had left knee pain mostly in the medial side of her knee.  She has had aspirations and steroid injections into the knee which provide pain relief for a few months but the pain tends to come back.  She states the swelling comes and goes.  She is currently in physical therapy and she states it does help with the pain and has improved her range of motion as well.  She denies previous surgeries to the left knee.  She denies popping, clicking, catching, or other mechanical symptoms at this time.  She was referred to our clinic for discussion of potential surgical interventions.      PMH:   Past Medical History:   Diagnosis Date    Hypertension        PSH:   Past Surgical History:   Procedure Laterality Date    none      REMOVAL OF CORNEAL EPITHELIUM WITH APPLICATION OF CHELATING AGENT Right 2019       SH:   Social History     Socioeconomic History    Marital status: Single   Tobacco Use    Smoking status: Never    Smokeless tobacco: Never   Substance and Sexual Activity    Alcohol use: Not Currently    Drug use: Never       FH:   Family History   Problem Relation Name Age of Onset    Diabetes Mother      Hypertension Father         Allergies:   Review of patient's allergies indicates:   Allergen Reactions    Codeine Blisters       ROS:  Constitutional- no fever, fatigue, weakness  Eye- no vision loss, eye redness, drainage, or pain  ENMT- no sore throat, ear pain, sinus pain/congestion, nasal congestion/drainage  Respiratory- no cough, wheezing, or shortness of breath  CV- no chest pain, no palpitations, no edema  GI- no N/V/D; no abdominal pain    Physical Exam:  There were no vitals filed for this visit.    General: NAD  Cardio: RRR by peripheral pulse  Pulm: Normal WOB on room air, symmetric chest rise  Abd: Soft, NT/ND    MSK:  Left knee:    Mild joint effusion.  Tenderness to palpation in the medial and lateral compartments, more so in the medial compartment.  Knee range of motion from 0-120 degrees of flexion.  Negative Fabiola's.  Knee stable to varus and valgus stress at 0 and 30° of flexion.  Lachman 1A.  Negative posterior drawer.  Neurovascular intact distally.    Imaging:   X-ray left knee:  Mild/moderate Medial joint space narrowing with osteophyte formation  MRI left knee:  Moderate joint effusion, medial meniscus tear, arthritis in the medial compartment    Assessment:  58-year-old female with mild/moderate osteoarthritis in the medial compartment as well as a medial meniscus tear.    -discussed with the patient she has both a meniscus tear and arthritis in the medial compartment and she has had some pain relief in the past with steroid injections however it was temporary.  She is currently in physical therapy and seems to be improving well with that.  We discussed the spectrum of treatment for arthritis and meniscus tears including oral and topical pain medicine, steroid injections, gel injections, physical therapy, knee arthroscopy, and knee replacement surgery.  Patient would like to avoid surgery for now and is interested in the gel injections.  Refer to sports Medicine Clinic.  She may follow-up as needed if she would like to discuss surgery again in the future.    Eugenio Rivera MD  [facility]  7/15/2024 4:19 PM

## 2024-07-18 NOTE — PROGRESS NOTES
Faculty Attestation: Annamarie Blackmon  was seen at [facility] in the Orthopaedic Clinic. Patient seen and evaluated at the time of the visit. History of Present Illness, Physical Exam, and Assessment and Plan reviewed. Treatment plan is reasonable and appropriate. Compliance with treatment recommendations is important. No procedure was performed.     Odilon Alexis MD  Orthopaedic Surgery

## 2024-07-31 ENCOUNTER — TELEPHONE (OUTPATIENT)
Dept: ORTHOPEDICS | Facility: CLINIC | Age: 59
End: 2024-07-31
Payer: MEDICAID

## 2024-07-31 NOTE — LETTER
[facility]   [address], [address]  Phone: [phone]  Fax: [phone]    Name:Annamarie Blackmon  :1965   Date:2024     PATIENT IS UNABLE TO WORK AS OF:  [_] Pending treatment.  [_] For approximately [_] Days [_] Weeks [_] Months  [_] Pending diagnostic testing.  [_] Pending surgical treatment.  [_] For approximately _ months (Post Surgery)    PATIENT IS ABLE TO RETURN TO WORK AS OF: 2024    [_] SEDENTARY WORK: Lifting 10 pounds maximum and occasionally lifting and/or carrying articles such as dockers, ledgers and small tools.  Although a sedentary job is defined as one which involved sitting, a certain amount of walking and standing are required only occasionally and other sedentary criteria are met.    [_] LIGHT WORK: Lifting 20 pounds with frequent lifting and/or carrying objects weighing up to 10 pounds.  Even though the weight lifted may be only a negotiable amount, a job is in the category when it involves sitting most of the time with a degree of pushing/pulling of arm and/or leg controls.    [X] MEDIUM WORK: Lifting of 50 pounds maximum with frequent lifting and/or carrying of objects up to 25 pounds. No standing for more than 30 minutes at a time or walking long periods of time, no stooping, kneeling, or squatting.     [_] HEAVY WORK: Lifting of 100 pounds maximum with frequent lifting and/or carrying objects up to 50 pounds.    [_] VERY HEAVY WORK: Lifting objects in excess of 100 pounds with frequent lifting and/or carrying of objects weighing 50 pounds or more.    [_] REGULAR DUTY: [_] No Restrictions. [_] With Restrictions (See comments below0:    COMMENTS      Eugenio Rivera MD

## 2024-07-31 NOTE — TELEPHONE ENCOUNTER
Patient called she is needing a treatment plan for her work stating what she can do and can not do

## 2024-08-19 NOTE — PROGRESS NOTES
"  Subjective:    Patient ID: Annamarie Blackmon is a 58 y.o. female  who presented to [facility] for new visit.      Chief Complaint: Pain of the Left Knee      History of Present Illness:  Annamarie Blackmon who has a history of left medial meniscus tear  presented today with with knee pain involving the left knee for the past 1 years. Pain is located the posterior aspect of the left knee and the medial aspect of the left knee.  Patient states that most of her pain is less time and she feels like there is a fluid.  She relates that then inciting event was catching a child at work and twisting where she felt a strain, this was about 1 year. Quality of pain is described as Pressure and Aching.  Inciting event:  Twisting event. Pain is aggravated by going up and down stairs, rising after sitting, and walking.  Patient has had no prior knee problems. Evaluation to date: plain films, MRI, PT evaluation, and Ortho evaluation. Treatment to date: avoidance of activity, topical analgesics, CSI, and ice/heat. Expectations for today's visit includes discuss possible knee injection.  Occupation includes  for spec.    Knee Review of Systems:  Swelling?  yes  Instability?  no  Mechanical sx?  no  <30 min AM stiffness? no  Limited ROM? no  Fever/Chills? no       Objective:      Physical Exam:    BP (!) 142/87   Pulse 73   Temp 97.6 °F (36.4 °C)   Ht 5' 4" (1.626 m)   Wt 86.8 kg (191 lb 5.8 oz)   BMI 32.85 kg/m²     Ortho/SPM Exam    Appearance:  Normal gait/station  FWB  Alignment: Left: mild varus Right: normal   Soft tissue swelling: Left: no Right: no  Effusion: Left:  Positive Right: Negative  Erythema: Left no Right: no  Ecchymosis: Left: no Right: no  Atrophy: Left: no Right: no    Palpation:  Knee Tenderness: Left: Medial joint line and posterior knee joint Right: None    Range of motion:  Flexion (140): Left:  90 Right: 140  Extension (0): Left: 1 Right: " 0    Strength:  Extension: Left 5/5  Pain: no     Right 5/5 Pain: no  Flexion: Left 5/5 Pain: no Right   5/5 Pain: no        Special Tests:  Ballotable Effusion:Left: Positive Right: Negative   Fluid Wave: Left: Positive Right: Negative   Crepitus: Left: Negative Right: Negative   Patellar grind test: Left: Negative  Right: Negative  Apprehension test: Left: Negative Right: Negative   Varus: @ 0, Left Positive Right: Negative.  @ 30, Left Positive  Right Negative   Valgus: @ 0, Left Negative Right: Negative.  @ 30, Left Negative  Right Negative  Lachman: Left: Negative Right: Negative   Ant Drawer: Left: Negative Right: Negative   Posterior Drawer: Left: Negative Right: Negative   Dial Test: Left: Not performed Right: Not performed   Fabiola: Left: Positive Right: Negative   Apley's: Left: Not performed Right: Not performed  Thessaly's: Left: Positive Right: Negative   Noble Compression: Left: Not performed Right: Not performed   Kg: Left: Not performed Right: Not performed       General appearance: NAD  Peripheral pulses: normal bilaterally   Reflexes: Left: normal Right normal   Sensation: normal    Labs:  Last A1c: The patient doesn't have any registry metric data available     Imaging:   Previous images reviewed.  X-rays ordered and performed today: no  My Interpretation:  shows DJD changes, likely chronic     Assessment:        Encounter Diagnoses   Code Name Primary?    M17.12 Primary osteoarthritis of left knee Yes    M25.462 Effusion of left knee     M23.204 Other old tear of medial meniscus of left knee     Z68.33 BMI 33.0-33.9,adult         Plan:          MDM: Prior external referring provider notes reviewed. Prior external referring provider studies reviewed.   Dx: left Knee Osteoarthritis, Medial meniscus tear - s/p left knee aspiration (15cc) and CSI at today's visit  Treatment Plan:   Discussed with patient diagnosis, prognosis, and treatment recommendations. Education provided.    Home physical  therapy exercise handouts provided to patient.   RX NSAID - see med list  topical hot or cold therapy  Over the counter NSAID and/or tylenol provided you do not have contraindications such as but not limited to liver or kidney disease or uncontrolled blood pressure. If you're doctors have told you to to not take them based on your health, do not take them.   oral glucosamine 1500 mg/day.  topical capsaicin as needed  Using a brace provided to you here or from your local pharmacy or durable medical equipment (DME) store.   Left knee aspiration (15cc) and CSI completed at today's visit. Will start the prior auth for VSI. Will have the patient continue with PT and follow up in 6 weeks for VSI.  Imaging: prior radiological studies independently reviewed; discussed with patient; agree with radiologist interpretation.   Weight Management: is paramount. Recommend at least 10% of total body weight loss if your BMI is 30-34.9. A BMI of <24.9 may provide further relief..   Procedure: Discussed CSI/VSI as treatment options; discussed CSI vs VS injections as treatment options; since conservative measures did not improve symptoms patient consented for CSI today.  Activity: Activity as tolerated; HEP to include aerobic conditioning and strength training with non-painful activity. ROM/STG exercises. Proper footware; assistive devises to avoid limping.   Therapy: Physical Therapy  Medication: START over-the-counter acetaminophen (Tylenol 1000 mg three times per day as needed) and Meloxicam. Please see your primary care physician for further refills.  RTC: 6 weeks for VSI         Large Joint Aspiration/Injection: L knee    Date/Time: 8/20/2024 7:30 AM    Performed by: Luna Valle MD  Authorized by: Luna Valle MD    Consent Done?:  Yes (Written)  Indications:  Arthritis and pain  Timeout: prior to procedure the correct patient, procedure, and site was verified    Prep: patient was prepped and draped in usual sterile fashion       Local anesthesia used?: Yes    Local anesthetic:  Topical anesthetic    Details:  Needle Size:  21 G  Ultrasonic Guidance for needle placement?: Yes    Images are saved and documented.  Approach:  Anterior  Location:  Knee  Site:  L knee  Aspirate amount (mL):  15  Aspirate:  Yellow  Patient tolerance:  Patient tolerated the procedure well with no immediate complications     Staff: Adilia Vizcaino MD    Risks:  Possible complications with the injection include bleeding, infection (.01%), tendon rupture, steroid flare, fat pad or soft tissue atrophy, skin depigmentation, allergic reaction to medications and vasovagal response. (steroid flare treatment is rest, ice, NSAIDs and resolves in 24-36 hours.)    Consent:  No absolute contraindications (cellulitis overlying joint, infection, lack of informed consent, allergy to injection medication, AVN protein or egg allergy for sodium hyaluronate, or history of steroid flare) or relative contraindications (uncontrolled DM2 A1c>10, coagulopathy, INR > 3.5, previous joint replacement or history of AVN).        Description:  The patient was prepped in normal sterile fashion use of chlorhexidine scrub and the appropriate and anatomic landmarks were identified with ultrasound.  Contents of syringe included: 5cc of 1% of lidocaine with 40mg of Kenalog     Post Procedure: Patient alert, and moving all extremities. ROM improved, pain decreased.  Good peripheral pulses, no signs of vascular compromise and range of motion intact.  Aftercare instructions were given to patient at time of discharge.  Relative rest for 3 days-avoiding excess activity.  Place ice on the area for 15 minutes every 4-6 hours. Patient may take Tylenol a 1000 mg b.i.d. or ibuprofen 600 mg t.i.d. for the next 3-4 days if not on medication already and safe to take pending co-morbidities.  Protect the area for the next 1-8 hours if anesthetic was used.  Avoid excessive activity for the next 3-4 weeks.  ER  precautions given for fever, severe joint pain or allergic reaction or other new symptoms related to the joint injection.           This note is dictated using the [employer] Fluency Direct word recognition program. There are word recognition mistakes that are occasionally missed on review.     Luna Valle MD  Sports Medicine Fellow

## 2024-08-20 ENCOUNTER — OFFICE VISIT (OUTPATIENT)
Dept: ORTHOPEDICS | Facility: CLINIC | Age: 59
End: 2024-08-20
Payer: MEDICAID

## 2024-08-20 VITALS
SYSTOLIC BLOOD PRESSURE: 142 MMHG | DIASTOLIC BLOOD PRESSURE: 87 MMHG | TEMPERATURE: 98 F | HEIGHT: 64 IN | BODY MASS INDEX: 32.67 KG/M2 | HEART RATE: 73 BPM | WEIGHT: 191.38 LBS

## 2024-08-20 DIAGNOSIS — M17.12 PRIMARY OSTEOARTHRITIS OF LEFT KNEE: Primary | ICD-10-CM

## 2024-08-20 DIAGNOSIS — M25.462 EFFUSION OF LEFT KNEE: ICD-10-CM

## 2024-08-20 DIAGNOSIS — M23.204 OTHER OLD TEAR OF MEDIAL MENISCUS OF LEFT KNEE: ICD-10-CM

## 2024-08-20 PROCEDURE — 99213 OFFICE O/P EST LOW 20 MIN: CPT | Mod: PBBFAC,25

## 2024-08-20 PROCEDURE — 20611 DRAIN/INJ JOINT/BURSA W/US: CPT | Mod: PBBFAC

## 2024-08-20 RX ORDER — LIDOCAINE HYDROCHLORIDE 10 MG/ML
5 INJECTION, SOLUTION EPIDURAL; INFILTRATION; INTRACAUDAL; PERINEURAL
Status: COMPLETED | OUTPATIENT
Start: 2024-08-20 | End: 2024-08-20

## 2024-08-20 RX ORDER — TRIAMCINOLONE ACETONIDE 40 MG/ML
40 INJECTION, SUSPENSION INTRA-ARTICULAR; INTRAMUSCULAR ONCE
Status: COMPLETED | OUTPATIENT
Start: 2024-08-20 | End: 2024-08-20

## 2024-08-20 RX ORDER — MELOXICAM 15 MG/1
15 TABLET ORAL DAILY
Qty: 15 TABLET | Refills: 0 | Status: SHIPPED | OUTPATIENT
Start: 2024-08-20 | End: 2024-09-04

## 2024-08-20 RX ADMIN — LIDOCAINE HYDROCHLORIDE 50 MG: 10 INJECTION, SOLUTION EPIDURAL; INFILTRATION; INTRACAUDAL; PERINEURAL at 09:08

## 2024-08-20 RX ADMIN — TRIAMCINOLONE ACETONIDE 40 MG: 40 INJECTION, SUSPENSION INTRA-ARTICULAR; INTRAMUSCULAR at 09:08

## 2024-08-20 NOTE — LETTER
August 20, 2024      [facility]  [address] [place] [address]  Phone: [phone]       Patient: Annamarie Blackmon   YOB: 1965  Date of Visit: 08/20/2024    To Whom It May Concern:    Maria T Blackmno  was at [facility] on 08/20/2024. The patient may return to work on 08/21/2024 with no restrictions. If you have any questions or concerns, or if I can be of further assistance, please do not hesitate to contact me.    Sincerely,  Luna Valle MD

## 2024-08-23 ENCOUNTER — TELEPHONE (OUTPATIENT)
Dept: ORTHOPEDICS | Facility: CLINIC | Age: 59
End: 2024-08-23

## 2024-08-23 NOTE — TELEPHONE ENCOUNTER
Sent PA request for left knee orthovisc to [facility] at [phone]. Will scan PA into chart.

## 2024-08-23 NOTE — TELEPHONE ENCOUNTER
----- Message from Luna Valle MD sent at 8/20/2024 11:33 AM CDT -----  Regarding: Visco PA  Please obtain prior authorization for viscosupplementation.  After obtaining, schedule a patient for procedure only appointment as appropriate with NP  (Euflexxa/Orthovisc/Hyalgan/Synvisc 1 visit per week for 3 weeks or Durolane 1 visit)     Laterality: left  Estimated Return to Clinic:     6 weeks for VSI.

## 2024-08-26 NOTE — TELEPHONE ENCOUNTER
Patient approved for left knee orthovisc Auth #:[id number]/RAD Blackmon From 10/1/24-1/1/25. Please schedule with . Thank you!

## 2024-11-08 ENCOUNTER — TELEPHONE (OUTPATIENT)
Dept: ORTHOPEDICS | Facility: CLINIC | Age: 59
End: 2024-11-08
Payer: MEDICAID

## 2024-11-08 NOTE — TELEPHONE ENCOUNTER
Left VM for Patient regarding health and disability form that was completed by Gwendolyn. Notified in VM that form was not faxed because there we're areas that needed signing by her employer. And that someone would be here until 330 today If she wants to pick it up.

## 2024-11-27 ENCOUNTER — OFFICE VISIT (OUTPATIENT)
Dept: OPHTHALMOLOGY | Facility: CLINIC | Age: 59
End: 2024-11-27
Payer: MEDICAID

## 2024-11-27 VITALS — WEIGHT: 191.38 LBS | HEIGHT: 64 IN | BODY MASS INDEX: 32.67 KG/M2

## 2024-11-27 DIAGNOSIS — H04.123 DRY EYE SYNDROME OF BOTH EYES: ICD-10-CM

## 2024-11-27 DIAGNOSIS — H40.053 BILATERAL OCULAR HYPERTENSION: Primary | ICD-10-CM

## 2024-11-27 DIAGNOSIS — H25.813 COMBINED FORMS OF AGE-RELATED CATARACT OF BOTH EYES: ICD-10-CM

## 2024-11-27 PROCEDURE — 99212 OFFICE O/P EST SF 10 MIN: CPT | Mod: PBBFAC,PN | Performed by: STUDENT IN AN ORGANIZED HEALTH CARE EDUCATION/TRAINING PROGRAM

## 2024-11-27 NOTE — PROGRESS NOTES
HPI    RTC 4-6 months IOP check  Last edited by Shanae Hargrove MA on 11/27/2024  2:00 PM.        Assessment /Plan     OCT RNFL  04/23/24  OD: 95 all green  OS: 112 white S rest green    HVF 24-2  04/23/24  OD: FL 0/10, 3% FP, 0% FN, reliable with ?SNS  OS: FL 3/11, 12% FP, 0% FN, mostly reliable and full    OCT mac   04/23/24: NFC no SRF/IRF OU      1. OHTN OU  - [facility], was followed as low risk glaucoma suspect  - Gonio 7/1/24: Open to SS   - CCT 7/1/24: 518//545 (pt with hx of LASIK OD)  - S/p SLT OU in 2021  - Tmax 30 // 25 (on combigan, latanoprost)  - CDR 0.5 // 0.4 with healthy rim OU  - OCT RNFL all green OD // white S rest green OS   - HVF full with ?SNS OD // borderline reliable and full OS  - No correlating thinning on OCT or fundus exam, unlikely to be glaucomatous  - Of note, patient reports eye pain with fluorescein..   - 11/27/24: On combigan and latanoprost, IOP 20//17, continue drops    2. 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 Phan in 2018  - on refresh drops PRN, doing well, K's clear  - monitor    3. Combined form cataracts OU  - NVS  - monitor    RTC 4-6 months for DFE and OCT RNFL

## 2025-01-30 ENCOUNTER — HOSPITAL ENCOUNTER (OUTPATIENT)
Dept: RADIOLOGY | Facility: HOSPITAL | Age: 60
Discharge: HOME | End: 2025-01-30
Attending: STUDENT IN AN ORGANIZED HEALTH CARE EDUCATION/TRAINING PROGRAM
Payer: MEDICAID

## 2025-01-30 ENCOUNTER — OFFICE VISIT (OUTPATIENT)
Dept: ORTHOPEDICS | Facility: CLINIC | Age: 60
End: 2025-01-30
Payer: MEDICAID

## 2025-01-30 VITALS
BODY MASS INDEX: 33.6 KG/M2 | DIASTOLIC BLOOD PRESSURE: 96 MMHG | SYSTOLIC BLOOD PRESSURE: 130 MMHG | HEIGHT: 64 IN | TEMPERATURE: 98 F | WEIGHT: 196.81 LBS | HEART RATE: 72 BPM

## 2025-01-30 DIAGNOSIS — M25.562 LEFT KNEE PAIN, UNSPECIFIED CHRONICITY: ICD-10-CM

## 2025-01-30 DIAGNOSIS — M17.12 PRIMARY OSTEOARTHRITIS OF LEFT KNEE: Primary | ICD-10-CM

## 2025-01-30 PROCEDURE — 73564 X-RAY EXAM KNEE 4 OR MORE: CPT | Mod: TC,LT

## 2025-01-30 PROCEDURE — 99213 OFFICE O/P EST LOW 20 MIN: CPT | Mod: PBBFAC,25 | Performed by: STUDENT IN AN ORGANIZED HEALTH CARE EDUCATION/TRAINING PROGRAM

## 2025-01-30 PROCEDURE — 20610 DRAIN/INJ JOINT/BURSA W/O US: CPT | Mod: PBBFAC,LT | Performed by: STUDENT IN AN ORGANIZED HEALTH CARE EDUCATION/TRAINING PROGRAM

## 2025-01-30 RX ORDER — LIDOCAINE HYDROCHLORIDE 10 MG/ML
5 INJECTION, SOLUTION EPIDURAL; INFILTRATION; INTRACAUDAL; PERINEURAL
Status: COMPLETED | OUTPATIENT
Start: 2025-01-30 | End: 2025-01-30

## 2025-01-30 RX ORDER — TRIAMCINOLONE ACETONIDE 40 MG/ML
40 INJECTION, SUSPENSION INTRA-ARTICULAR; INTRAMUSCULAR ONCE
Status: COMPLETED | OUTPATIENT
Start: 2025-01-30 | End: 2025-01-30

## 2025-01-30 RX ORDER — DICLOFENAC SODIUM 10 MG/G
2 GEL TOPICAL 4 TIMES DAILY
Qty: 100 G | Refills: 3 | Status: SHIPPED | OUTPATIENT
Start: 2025-01-30

## 2025-01-30 RX ORDER — MELOXICAM 15 MG/1
15 TABLET ORAL DAILY
Qty: 30 TABLET | Refills: 0 | Status: SHIPPED | OUTPATIENT
Start: 2025-01-30 | End: 2025-03-01

## 2025-01-30 RX ADMIN — LIDOCAINE HYDROCHLORIDE 50 MG: 10 INJECTION, SOLUTION EPIDURAL; INFILTRATION; INTRACAUDAL; PERINEURAL at 09:01

## 2025-01-30 RX ADMIN — TRIAMCINOLONE ACETONIDE 40 MG: 40 INJECTION, SUSPENSION INTRA-ARTICULAR; INTRAMUSCULAR at 09:01

## 2025-01-30 NOTE — PROGRESS NOTES
"Subjective:    Patient ID: Annamarie Blackmon is a 59 y.o. female  who presented to [facility] for follow up.      Chief Complaint: Follow-up of the Left Knee (F/u left knee fluid removal. Patient states her left knee is still in pain, pain is mostly in knee but makes back of the left leg calf very stiff. Patient was prescribed meloxicam for her pain, was working at first but now its not showing progress for knee. )      History of Present Illness:  Annamarie Blackmon is a 59-year-old female with past medical history of left knee osteoarthritis who presents to the clinic today for re-evaluation of her left knee pain that has been worsening over the past 2 months.  Patient reports that she has been attending formal physical therapy for the past 3 months which has helped her with her pain.  She has tried using oral and topical medications including Voltaren gel and meloxicam without any significant improvement.  She reports her pain today is a 8/10 worse with standing, walking.  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  She believes that she may have overdone it and that is why she is having knee pain.      Knee Review of Systems:  Swelling?  yes  Instability?  no  Mechanical sx?  no  <30 min AM stiffness? no  Limited ROM? yes  Fever/Chills? no    Comorbid Conditions         Objective:      Physical Exam:    BP (!) 130/96 (BP Location: Left arm, Patient Position: Sitting) Comment: Did not take bp meds.  Pulse 72   Temp 98 °F (36.7 °C) (Oral)   Ht 5' 4" (1.626 m)   Wt 89.3 kg (196 lb 12.8 oz)   BMI 33.78 kg/m²     Ortho/SPM Exam    Appearance:  Normal gait/station  FWB  Alignment: Left: normal Right: normal   Soft tissue swelling: Left: no Right: no  Effusion: Left:  Negative Right: Negative  Erythema: Left no Right: no  Ecchymosis: Left: no Right: no  Atrophy: Left: no Right: " no    Palpation:  Knee Tenderness: Left: Medial joint line Right: None    Range of motion:  Flexion (140): Left:  130 Right: 140  Extension (0): Left: 5 Right: 0    Strength:  Extension: Left 5/5  Pain: no     Right 5/5 Pain: no  Flexion: Left 5/5 Pain: no Right   5/5 Pain: no        Special Tests:  Ballotable Effusion:Left: Positive Right: Negative   Fluid Wave: Left: Positive Right: Negative   Crepitus: Left: Negative Right: Negative   Patellar grind test: Left: Negative  Right: Negative  Apprehension test: Left: Negative Right: Negative   Varus: @ 0, Left Negative Right: Negative.  @ 30, Left Negative  Right Negative   Valgus: @ 0, Left Negative Right: Negative.  @ 30, Left Negative  Right Negative  Lachman: Left: Negative Right: Negative   Ant Drawer: Left: Negative Right: Negative   Posterior Drawer: Left: Negative Right: Negative   Dial Test: Left: Not performed Right: Not performed   Fabiola: Left: Negative Right: Negative   Apley's: Left: Not performed Right: Not performed  Thessaly's: Left: Not performed Right: Not performed   Noble Compression: Left: Not performed Right: Not performed   Kg: Left: Not performed Right: Not performed       General appearance: NAD  Peripheral pulses: normal bilaterally   Reflexes: Left: normal Right normal   Sensation: normal    Labs:  Last A1c: The patient doesn't have any registry metric data available     Imaging:   Previous images reviewed.  X-rays ordered and performed today: no  # of views: 4 Laterality: left  My Interpretation:  Tricompartment osteoarthritis with the medial compartment being the worse.  Kl grade 3      Assessment:        Encounter Diagnoses   Code Name Primary?    M17.12 Primary osteoarthritis of left knee Yes        Plan:   Dx: left Knee Osteoarthritis.  Moderate Exacerbation  Treatment Plan: : Discussed with patient diagnosis and treatment recommendations. Education provided. Recommend conservative treatment to include: avoidance of aggravating  activity, significant modification of daily activities, hot/cold therapies, topical and oral medications, braces, HEP/PT/OT, and injections.  Patient with exacerbation of her left knee osteoarthritis.  We will give patient a corticosteroid injection today.  Patient continues topical and oral medications for pain relief.  Medications refilled. We will see patient back in 6 weeks for re-evaluation.    Imaging: radiological studies ordered and independently reviewed; discussed with patient; agree with radiologist interpretation.   Weight Management: is paramount. recommend at least 10% of total body weight loss if your bmi is 30-34.9. A bmi 24.9 or less may provide further relief..   Procedure: Discussed CSI/VSI as treatment options; discussed injections as treatment options; since conservative measures did not improve symptoms patient consent for VS; pending PA approval.  Activity: Activity as tolerated; HEP to include aerobic conditioning and strength training with non-painful activity. ROM/STG exercises. Proper footware; assistive devises to avoid limping.   Therapy: Physical Therapy  Medication: START over-the-counter acetaminophen (Tylenol 1000 mg three times per day as needed)  CONTINUE previously prescribed medication meloxicam  CONTINUE Voltaren Gel 1% as prescribed. Please see your primary care physician for further refills.  RTC:  6 weeks.         Large Joint Aspiration/Injection: L supra patellar bursa    Date/Time: 1/30/2025 7:30 AM    Performed by: Cale Simmons DO  Authorized by: Cale Simmons, DO    Consent Done?:  Yes (Written)  Indications:  Arthritis and pain  Prep: patient was prepped and draped in usual sterile fashion      Local anesthesia used?: Yes    Local anesthetic:  Topical anesthetic    Details:  Needle Size:  21 G  Approach:  Superior  Location:  Knee  Site:  L supra patellar bursa  Aspirate amount (mL):  35  Aspirate:  Clear, serous and yellow  Patient tolerance:  Patient tolerated the  procedure well with no immediate complications     Staff: Junie Woods MD    Risks:  Possible complications with the injection include bleeding, infection (.01%), tendon rupture, steroid flare, fat pad or soft tissue atrophy, skin depigmentation, allergic reaction to medications and vasovagal response. (steroid flare treatment is rest, ice, NSAIDs and resolves in 24-36 hours.)    Consent:  No absolute contraindications (cellulitis overlying joint, infection, lack of informed consent, allergy to injection medication, AVN protein or egg allergy for sodium hyaluronate, or history of steroid flare) or relative contraindications (uncontrolled DM2 A1c>10, coagulopathy, INR > 3.5, previous joint replacement or history of AVN).        Description:  The patient was prepped in normal sterile fashion use of chlorhexidine scrub and the appropriate and anatomic landmarks were identified with ultrasound.  Contents of syringe included: 5cc of 1% lidocaine with 40mg of Kenalog    Post Procedure: Patient alert, and moving all extremities. ROM improved, pain decreased.  Good peripheral pulses, no signs of vascular compromise and range of motion intact.  Aftercare instructions were given to patient at time of discharge.  Relative rest for 3 days-avoiding excess activity.  Place ice on the area for 15 minutes every 4-6 hours. Patient may take Tylenol a 1000 mg b.i.d. or ibuprofen 600 mg t.i.d. for the next 3-4 days if not on medication already and safe to take pending co-morbidities.  Protect the area for the next 1-8 hours if anesthetic was used.  Avoid excessive activity for the next 3-4 weeks.  ER precautions given for fever, severe joint pain or allergic reaction or other new symptoms related to the joint injection.              Cale Simmons D.O.  Sports Medicine Fellow

## 2025-01-30 NOTE — LETTER
January 30, 2025      [facility]  [address] [place] [id number]  Phone: [phone]       Patient: Annamarie Blackmon   YOB: 1965  Date of Visit: 01/30/2025    To Whom It May Concern:    Maria T Blackmon  was at [facility] on 01/30/2025. The patient may return to work/school on 01/30/2025 with no restrictions. If you have any questions or concerns, or if I can be of further assistance, please do not hesitate to contact me.    Sincerely,    DO Adamaris Galindo MA

## 2025-01-31 ENCOUNTER — TELEPHONE (OUTPATIENT)
Dept: ORTHOPEDICS | Facility: CLINIC | Age: 60
End: 2025-01-31
Payer: MEDICAID

## 2025-01-31 NOTE — TELEPHONE ENCOUNTER
[facility]. Will scan into chart and update once notified. Thanks.

## 2025-01-31 NOTE — TELEPHONE ENCOUNTER
----- Message from Cale Simmons sent at 1/30/2025  8:40 PM CST -----  Regarding: Visco PA  Please obtain prior authorization for viscosupplementation.  After obtaining, schedule a patient for a regular appointment as appropriate with Dr. Cale Simmons  (Euflexxa/Orthovisc/Hyalgan/Synvisc 1 visit per week for 3 weeks or Durolane 1 visit)     Laterality:  Left  Estimated Return to Clinic: When PA is complete    Cale Simmons D.O.  Sports Medicine Fellow

## 2025-02-05 NOTE — TELEPHONE ENCOUNTER
PA approved for left knee Orthovisc, Auth# [id number]. Good from 02/10/25 until 07/10/2025, Please schedule Pt with Dr. Simmons on or after 02/10/25.

## 2025-02-28 ENCOUNTER — TELEPHONE (OUTPATIENT)
Dept: OPHTHALMOLOGY | Facility: CLINIC | Age: 60
End: 2025-02-28
Payer: MEDICAID

## 2025-02-28 NOTE — TELEPHONE ENCOUNTER
Called patient at Dr. Cramer request to clarify, patient stated she does not know why she is seeing 2 Ophthalmologist, states they give her an appointment so she goes. Sent request to Dr. Tan office to clarify due to this is our established patient that they are referring to us. Patient already has a follow up scheduled, but does not need to see Dr. Tan and Dr. Cramer, awaiting response from Dr. Tan office.    ----- Message from Li Cramer MD sent at 2/18/2025  2:17 PM CST -----  Regarding: RE: referral  Why what is the new problem?  She had no urgent issues when we saw her.  ----- Message -----  From: Pratima Louis RN  Sent: 2/18/2025   1:39 PM CST  To: Li Cramer MD  Subject: referral                                         Dr. Tan office requesting referral ASAP, but this is one of our patients that was last seen 11/2024. She is seeing 2 different ophthalmologist? They are requesting ASAP. Do not show patient has called with any issues.

## 2025-04-10 DIAGNOSIS — H40.053 BILATERAL OCULAR HYPERTENSION: ICD-10-CM

## 2025-04-10 RX ORDER — BRIMONIDINE TARTRATE, TIMOLOL MALEATE 2; 5 MG/ML; MG/ML
1 SOLUTION/ DROPS OPHTHALMIC 2 TIMES DAILY
Qty: 5 ML | Refills: 11 | Status: SHIPPED | OUTPATIENT
Start: 2025-04-10

## 2025-04-14 ENCOUNTER — TELEPHONE (OUTPATIENT)
Dept: OPHTHALMOLOGY | Facility: CLINIC | Age: 60
End: 2025-04-14
Payer: MEDICAID

## 2025-04-14 DIAGNOSIS — H40.053 BILATERAL OCULAR HYPERTENSION: ICD-10-CM

## 2025-04-14 RX ORDER — BRIMONIDINE TARTRATE, TIMOLOL MALEATE 2; 5 MG/ML; MG/ML
1 SOLUTION/ DROPS OPHTHALMIC 2 TIMES DAILY
Qty: 5 ML | Refills: 11 | Status: SHIPPED | OUTPATIENT
Start: 2025-04-14

## 2025-05-30 ENCOUNTER — OFFICE VISIT (OUTPATIENT)
Dept: OPHTHALMOLOGY | Facility: CLINIC | Age: 60
End: 2025-05-30
Payer: MEDICAID

## 2025-05-30 VITALS — HEIGHT: 64 IN | BODY MASS INDEX: 33.49 KG/M2 | WEIGHT: 196.19 LBS

## 2025-05-30 DIAGNOSIS — H25.813 COMBINED FORMS OF AGE-RELATED CATARACT OF BOTH EYES: ICD-10-CM

## 2025-05-30 DIAGNOSIS — H40.053 BILATERAL OCULAR HYPERTENSION: Primary | ICD-10-CM

## 2025-05-30 DIAGNOSIS — H04.123 DRY EYE SYNDROME OF BOTH EYES: ICD-10-CM

## 2025-05-30 DIAGNOSIS — H40.053 BILATERAL OCULAR HYPERTENSION: ICD-10-CM

## 2025-05-30 PROCEDURE — 99213 OFFICE O/P EST LOW 20 MIN: CPT | Mod: PBBFAC,PN

## 2025-05-30 RX ORDER — MELOXICAM 15 MG/1
15 TABLET ORAL DAILY
COMMUNITY
Start: 2025-05-07

## 2025-05-30 RX ORDER — DORZOLAMIDE HCL 20 MG/ML
1 SOLUTION/ DROPS OPHTHALMIC 2 TIMES DAILY
Qty: 10 ML | Refills: 3 | Status: SHIPPED | OUTPATIENT
Start: 2025-05-30

## 2025-05-30 RX ORDER — ACETAZOLAMIDE 250 MG/1
250 TABLET ORAL 2 TIMES DAILY
COMMUNITY
Start: 2025-05-27

## 2025-05-30 RX ORDER — LATANOPROST 50 UG/ML
1 SOLUTION/ DROPS OPHTHALMIC NIGHTLY
Qty: 2.5 ML | Refills: 11 | Status: SHIPPED | OUTPATIENT
Start: 2025-05-30

## 2025-05-30 RX ORDER — BRIMONIDINE TARTRATE, TIMOLOL MALEATE 2; 5 MG/ML; MG/ML
1 SOLUTION/ DROPS OPHTHALMIC 2 TIMES DAILY
Qty: 10 ML | Refills: 5 | Status: SHIPPED | OUTPATIENT
Start: 2025-05-30

## 2025-05-30 NOTE — PROGRESS NOTES
HPI       RTC 4-6 months for DFE and OCT RNFL    Last edited by Pratima Louis RN on 5/30/2025 10:17 AM.            Assessment /Plan     For exam results, see Encounter Report.    Bilateral ocular hypertension    Combined forms of age-related cataract of both eyes    Dry eye syndrome of both eyes        OCT RNFL 04/23/24  OD: 95 all green  OS: 112 white S rest green    OCT RNFL 5/30/25  OD: 90, Yellow I, rest green  OS: 107, White S, rest green      HVF 24-2  04/23/24  OD: FL 0/10, 3% FP, 0% FN, reliable with ?SNS  OS: FL 3/11, 12% FP, 0% FN, mostly reliable and full      OCT mac   04/23/24: NFC no SRF/IRF OU      1. OHTN OU  - [facility]erred [facility], was followed as low risk glaucoma suspect  - Gonio 7/1/24: Open to SS   - CCT 7/1/24: 518//545 (pt with hx of LASIK OD)  - S/p SLT OU in 2021  - Tmax 30 // 25 (on combigan, latanoprost)  - CDR 0.5 // 0.4 with healthy rim OU  - OCT RNFL all green OD // white S rest green OS   - HVF full with ?SNS OD // borderline reliable and full OS  - New correlating thinning on OCT 5/30/25  - Of note, patient reports eye pain with fluorescein..   - 11/27/24: On combigan and latanoprost, IOP 20//17, continue drops  - 5/30/25: IOP stable, possible new thinning inf OS on OCT RNFL. Exam/CD stable. Given possible SNS on last VF and inf thinning on RNFL today, will add Dorzolamide BID.   - Start Dorzolamide BID   - Pt follows w/ Dr Phan and would like to just see 1 ophthalmologist., OK to follow at [facility] only for now     2. 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 Phan in 2018  - on refresh drops PRN, doing well, K's clear  - monitor    3. Combined form cataracts OU  - NVS  - monitor    RTC 6-8 weeks for IOP,  review VF 24-2 (maximo day prior)

## 2025-06-02 ENCOUNTER — TELEPHONE (OUTPATIENT)
Dept: OPHTHALMOLOGY | Facility: CLINIC | Age: 60
End: 2025-06-02
Payer: MEDICAID

## 2025-06-02 PROBLEM — H04.123 DRY EYE SYNDROME OF BOTH EYES: Status: ACTIVE | Noted: 2025-06-02

## 2025-06-02 PROBLEM — H40.053 BILATERAL OCULAR HYPERTENSION: Status: ACTIVE | Noted: 2025-06-02

## 2025-07-07 ENCOUNTER — CLINICAL SUPPORT (OUTPATIENT)
Dept: OPHTHALMOLOGY | Facility: CLINIC | Age: 60
End: 2025-07-07
Payer: MEDICAID

## 2025-07-07 VITALS — BODY MASS INDEX: 33.49 KG/M2 | WEIGHT: 196.19 LBS | HEIGHT: 64 IN

## 2025-07-07 DIAGNOSIS — H40.053 BILATERAL OCULAR HYPERTENSION: Primary | ICD-10-CM

## 2025-07-07 PROCEDURE — 92082 INTERMEDIATE VISUAL FIELD XM: CPT | Mod: PBBFAC,PN,LT

## 2025-07-07 PROCEDURE — 99213 OFFICE O/P EST LOW 20 MIN: CPT | Mod: PBBFAC,PN

## 2025-07-07 PROCEDURE — 92083 EXTENDED VISUAL FIELD XM: CPT | Mod: PBBFAC,PN | Performed by: OPHTHALMOLOGY

## 2025-07-07 NOTE — PROGRESS NOTES
[facility] LUCIUS Day  Last edited by Lucinda Glaser on 7/7/2025 10:00 AM.            Assessment /Plan     For exam results, see Encounter Report.    Bilateral ocular hypertension  -     Akbar Visual Field - Intermediate - OU - Both Eyes; Future      Patient here for [facility] nurse visit DAINA Glaser, COA

## 2025-07-09 NOTE — PROGRESS NOTES
HPI    HVF LUCIUS Day  Last edited by Lucinda Glaser on 7/7/2025 10:00 AM.            Assessment /Plan     For exam results, see Encounter Report.    Bilateral ocular hypertension  -     Cancel: [facility] - Intermediate - OU - Both Eyes; Future  -     Akbar Visual [facility] - Intermediate - OU - Both Eyes        OCT RNFL 04/23/24  OD: 95 all green  OS: 112 white S rest green    OCT RNFL 5/30/25  OD: 90, Yellow I, rest green  OS: 107, White S, rest green      HVF 24-2  04/23/24  OD: FL 0/10, 3% FP, 0% FN, reliable with ?SNS  OS: FL 3/11, 12% FP, 0% FN, mostly reliable and full   7/7/2025  OD: Reliable, Possible early superior nasal step  OS: Reliable, full      OCT mac   04/23/24: NFC no SRF/IRF OU      1. OHTN OD/ POAG OS  - Referred from [facility], was followed as low risk glaucoma suspect  - Gonio 7/1/24: Open to SS   - CCT 7/1/24: 518//545 (pt with hx of LASIK OD)  - S/p SLT OU in 2021  - Tmax 30 // 25 (on combigan, latanoprost)  - CDR 0.5 // 0.4 with healthy rim OU  - OCT RNFL all green OD // white S rest green OS   - HVF full with ?SNS OD // borderline reliable and full OS  - New correlating thinning on OCT 5/30/25  - Of note, patient reports eye pain with fluorescein..   - 11/27/24: On combigan and latanoprost, IOP 20//17, continue drops  - 5/30/25: IOP stable, possible new thinning inf OS on OCT RNFL. Exam/CD stable. Given possible SNS on last VF and inf thinning on RNFL today, will add Dorzolamide BID.   - Start Dorzolamide BID   - Pt follows w/ Dr Phan and would like to just see 1 ophthalmologist., OK to follow at [facility] only for now   7/7/2025:The patient presented for a HVF only. The still has early superior nasal step changes OS.  The right HVF is still full.  Will check IOP on return.    2. 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 Phan in 2018  - on refresh drops PRN, doing well, K's clear  - monitor    3. Combined form cataracts OU  - NVS  -  monitor    RTC 6-8 weeks for IOP 7/29/25

## 2025-07-10 ENCOUNTER — OFFICE VISIT (OUTPATIENT)
Dept: ORTHOPEDICS | Facility: CLINIC | Age: 60
End: 2025-07-10
Payer: MEDICAID

## 2025-07-10 ENCOUNTER — LAB VISIT (OUTPATIENT)
Dept: LAB | Facility: HOSPITAL | Age: 60
End: 2025-07-10
Payer: MEDICAID

## 2025-07-10 VITALS
HEIGHT: 64 IN | HEART RATE: 81 BPM | BODY MASS INDEX: 33.49 KG/M2 | WEIGHT: 196.19 LBS | DIASTOLIC BLOOD PRESSURE: 89 MMHG | SYSTOLIC BLOOD PRESSURE: 144 MMHG

## 2025-07-10 DIAGNOSIS — M25.462 EFFUSION OF BURSA OF LEFT KNEE: ICD-10-CM

## 2025-07-10 DIAGNOSIS — R79.9 ELEVATED RESULT IN MULTI-BIOMARKER DISEASE ACTIVITY PANEL FOR RHEUMATOID ARTHRITIS: ICD-10-CM

## 2025-07-10 DIAGNOSIS — M17.12 PRIMARY OSTEOARTHRITIS OF LEFT KNEE: Primary | ICD-10-CM

## 2025-07-10 LAB
BASOPHILS # BLD AUTO: 0.03 X10(3)/MCL
BASOPHILS NFR BLD AUTO: 0.5 %
CLARITY BODY FLUID (OLG): NORMAL
COLOR BODY FLUID (OLG): YELLOW
CRP SERPL-MCNC: 3.3 MG/L
EOSINOPHIL # BLD AUTO: 0.05 X10(3)/MCL (ref 0–0.9)
EOSINOPHIL NFR BLD AUTO: 0.9 %
ERYTHROCYTE [DISTWIDTH] IN BLOOD BY AUTOMATED COUNT: 12.9 % (ref 11.5–17)
ERYTHROCYTE [SEDIMENTATION RATE] IN BLOOD: 19 MM/HR (ref 0–20)
GRAM STN SPEC: NORMAL
GRAM STN SPEC: NORMAL
HCT VFR BLD AUTO: 39.6 % (ref 37–47)
HGB BLD-MCNC: 13.7 G/DL (ref 12–16)
IMM GRANULOCYTES # BLD AUTO: 0.01 X10(3)/MCL (ref 0–0.04)
IMM GRANULOCYTES NFR BLD AUTO: 0.2 %
LYMPHOCYTES # BLD AUTO: 2.06 X10(3)/MCL (ref 0.6–4.6)
LYMPHOCYTES NFR BLD AUTO: 36.8 %
LYMPHOCYTES NFR FLD MANUAL: 61 %
MCH RBC QN AUTO: 33.3 PG (ref 27–31)
MCHC RBC AUTO-ENTMCNC: 34.6 G/DL (ref 33–36)
MCV RBC AUTO: 96.1 FL (ref 80–94)
MONOCYTE MAN % BF (OLG): 34 %
MONOCYTES # BLD AUTO: 0.51 X10(3)/MCL (ref 0.1–1.3)
MONOCYTES NFR BLD AUTO: 9.1 %
NEUTROPHILS # BLD AUTO: 2.94 X10(3)/MCL (ref 2.1–9.2)
NEUTROPHILS MAN % BF (OLG): 5 %
NEUTROPHILS NFR BLD AUTO: 52.5 %
NRBC BLD AUTO-RTO: 0 %
PLATELET # BLD AUTO: 242 X10(3)/MCL (ref 130–400)
PMV BLD AUTO: 11.5 FL (ref 7.4–10.4)
RBC # BLD AUTO: 4.12 X10(6)/MCL (ref 4.2–5.4)
RBC BODY FLUID (OLG): 2000 /UL
RHEUMATOID FACT SERPL-ACNC: 43 IU/ML
WBC # BLD AUTO: 5.6 X10(3)/MCL (ref 4.5–11.5)
WBC # FLD AUTO: 179 /UL

## 2025-07-10 PROCEDURE — 99213 OFFICE O/P EST LOW 20 MIN: CPT | Mod: PBBFAC,25

## 2025-07-10 PROCEDURE — 87070 CULTURE OTHR SPECIMN AEROBIC: CPT

## 2025-07-10 PROCEDURE — 89051 BODY FLUID CELL COUNT: CPT

## 2025-07-10 PROCEDURE — 89060 EXAM SYNOVIAL FLUID CRYSTALS: CPT

## 2025-07-10 PROCEDURE — 86431 RHEUMATOID FACTOR QUANT: CPT | Mod: 59

## 2025-07-10 PROCEDURE — 86140 C-REACTIVE PROTEIN: CPT

## 2025-07-10 PROCEDURE — 86200 CCP ANTIBODY: CPT

## 2025-07-10 PROCEDURE — 36415 COLL VENOUS BLD VENIPUNCTURE: CPT

## 2025-07-10 PROCEDURE — 87075 CULTR BACTERIA EXCEPT BLOOD: CPT

## 2025-07-10 PROCEDURE — 20611 DRAIN/INJ JOINT/BURSA W/US: CPT | Mod: PBBFAC

## 2025-07-10 PROCEDURE — 86039 ANTINUCLEAR ANTIBODIES (ANA): CPT

## 2025-07-10 PROCEDURE — 85652 RBC SED RATE AUTOMATED: CPT

## 2025-07-10 PROCEDURE — 87205 SMEAR GRAM STAIN: CPT

## 2025-07-10 PROCEDURE — 86431 RHEUMATOID FACTOR QUANT: CPT

## 2025-07-10 PROCEDURE — 85025 COMPLETE CBC W/AUTO DIFF WBC: CPT

## 2025-07-10 RX ORDER — LIDOCAINE HYDROCHLORIDE 10 MG/ML
5 INJECTION, SOLUTION EPIDURAL; INFILTRATION; INTRACAUDAL; PERINEURAL
Status: COMPLETED | OUTPATIENT
Start: 2025-07-10 | End: 2025-07-10

## 2025-07-10 RX ADMIN — LIDOCAINE HYDROCHLORIDE 50 MG: 10 INJECTION, SOLUTION EPIDURAL; INFILTRATION; INTRACAUDAL; PERINEURAL at 10:07

## 2025-07-10 NOTE — PROGRESS NOTES
"  Subjective:    Patient ID: Annamarie Blackmon is a 59 y.o. female who presented to [facility] for follow up.    Chief Complaint: Pain of the Left Knee    History of Present Illness:  Annamarie Blackmon presents to the clinic for follow up of left knee pain and effusion has worsened over the last few months. Pain is located globally to the left knee and ranks the pain a 6/10. Quality of pain is described as Dull and Pressure. Inciting event: none known. Pain is aggravated by any weight bearing, rising after sitting, standing, and walking. Patient has had prior knee problems. Evaluation to date: plain films, MRI, PCP evaluation, and ER evaluation. Treatment to date: avoidance of activity, bracing, topical analgesics, oral analgesics, CSI, PT, and home exercises. Expectations for today's visit: aspiration. Occupation:  for spec.     Knee Review of Systems:  Swelling?  yes  Instability?  no  Mechanical sx?  no  <30 min AM stiffness? no  Limited ROM? no  Fever/Chills? no    Objective:     Physical Exam:  BP (!) 144/89 Comment: Pt did not take BP medication.  Pulse 81   Ht 5' 4" (1.626 m)   Wt 89 kg (196 lb 3.4 oz)   BMI 33.68 kg/m²     Appearance:  antalgic - FWB  Alignment: Left: normal Right: normal   Soft tissue swelling: Left: no Right: no  Effusion: Left:  Negative Right: Negative  Erythema: Left no Right: no  Ecchymosis: Left: no Right: no  Atrophy: Left: no Right: no    Palpation:  Knee Tenderness: Left: Medial joint line and posterior knee joint Right: None    Range of motion:  Flexion (140): Left:  90 Right: 140  Extension (0): Left: 1 Right: 0    Strength:  Extension: Left 5/5  Pain: yes     Right 5/5 Pain: no  Flexion: Left 5/5 Pain: yes Right   5/5 Pain: no    Special Tests:  Ballotable Effusion:Left: Positive Right: Negative   Fluid Wave: Left: Positive Right: Negative   Crepitus: Left: Negative Right: Negative   Patellar grind test: Left: Negative  " Right: Negative  Apprehension test: Left: Negative Right: Negative   Varus: @ 0, Left Positive Right: Negative.  @ 30, Left Positive  Right Negative   Valgus: @ 0, Left Negative Right: Negative.  @ 30, Left Negative  Right Negative  Lachman: Left: Negative Right: Negative   Ant Drawer: Left: Negative Right: Negative   Posterior Drawer: Left: Negative Right: Negative   Dial Test: Left: Not performed Right: Not performed   Fabiola: Left: Positive Right: Negative   Apley's: Left: Not performed Right: Not performed  Thessaly's: Left: Positive Right: Not performed   Noble Compression: Left: Not performed Right: Not performed   Kg: Left: Not performed Right: Not performed     General appearance: NAD  Peripheral pulses: normal bilaterally   Reflexes: Left: normal Right normal   Sensation: normal    Labs:  Last A1c: The patient doesn't have any registry metric data available     Imaging:   Previous images reviewed.  X-rays ordered and performed today: no  Left knee XR on 1/30/25, My Interpretation: Tricompartment osteoarthritis with the medial compartment being the worse. KL grade 3.    MRI Right Knee on 6/21/24, My Interpretation:  Body and posterior medial meniscus tear as mostly horizontal with an unstable meniscal fragment extruding into the medial recess.  MCL sprain also noted.  Grade 4 chondromalacia to the medial compartment.  Knee effusion also noted.    Assessment:     Encounter Diagnoses   Code Name Primary?    M17.12 Primary osteoarthritis of left knee Yes    M25.462 Effusion of bursa of left knee     R79.9 Elevated result in multi-biomarker disease activity panel for rheumatoid arthritis        Plan:      Dx: Left Knee Osteoarthritis with recurrent effusion -  Acute moderate exacerbation  Treatment Plan: Discussed with patient diagnosis and treatment recommendations. Recommend conservative treatment to include: avoidance of aggravating activity, significant modification of daily activities, hot/cold  therapies, topical and oral medications, braces, HEP/PT/OT, and injections.  Discussed aspiration and injection with the patient.  She states that she recently was seen by her optometrist who recommended that she not continue with steroid injections.  We will proceed with aspiration of the left knee.  The patient has had multiple aspirations of her left knee at this point in time therefore would send off the joint fluid for further analysis as well as obtain blood work to evaluate for autoimmune causes of her recurrent effusion.  Patient agrees with the plan. See procedure note below. A total of 35 cc was aspirated out of her left knee.  We will evaluate joint fluid for crystals, cell count, and Gram stain.  Also ordered CBC, CMP, RA panel, KALE, and CCP.  We will call the patient with lab work results when they are available.  **Addendum on 7/14/25:  Called and reviewed lab work with the patient.  No concerns with joint fluid aspirated from her left knee.  Blood work did show elevated RA panel with normal CCP and KALE.  Discussed referring the patient to Rheumatology and encouraged the patient to follow up with her PCP.  Discussed that her PCP may be able to start her on initial treatment for rheumatoid arthritis if they feel appropriate while she waits for initial appointment with Rheumatology.  Patient expressed understanding.  Referral to rheumatology placed.  Imaging: prior radiological studies independently reviewed; discussed with patient; agree with radiologist interpretation.   Weight Management: is paramount. Recommend at least 10% of total body weight loss if your BMI is 30-34.9. A BMI of <24.9 may provide further relief.  Procedure: Discussed injections as treatment options; discussed injections as treatment options; since conservative measures did not improve symptoms patient consented for aspiration today.  Activity: Activity as tolerated; HEP to include aerobic conditioning and strength training with  non-painful activity. ROM/STG exercises. Proper footware; assistive devises to avoid limping.   Therapy: HEP  Medication: CONTINUE over-the-counter acetaminophen (Tylenol 1000 mg three times per day as needed)  CONTINUE Voltaren Gel 1% as prescribed  CONTINUE over-the-counter NSAIDs (ibuprofen 200mg three tablets three times a day as needed). Please see your primary care physician for further refills.  RTC: 3 months.       Large Joint Aspiration/Injection: L supra patellar bursa    Date/Time: 7/10/2025 8:30 AM    Performed by: Luna Valle MD  Authorized by: Luna Valle MD    Consent Done?:  Yes (Written)  Indications:  Arthritis and pain  Timeout: prior to procedure the correct patient, procedure, and site was verified    Prep: patient was prepped and draped in usual sterile fashion      Local anesthesia used?: Yes    Local anesthetic:  Topical anesthetic and lidocaine 1% without epinephrine  Anesthetic total (ml):  5      Details:  Needle Size:  18 G and 25 G  Ultrasonic Guidance for needle placement?: Yes    Images are saved and documented.  Approach:  Superior  Location:  Knee  Site:  L supra patellar bursa  Aspirate amount (mL):  35  Aspirate:  Serous and yellow  Patient tolerance:  Patient tolerated the procedure well with no immediate complications    Staff Attending: Ruben Lujan MD    Risks:  Possible complications with the injection include bleeding, infection (.01%), tendon rupture, steroid flare, fat pad or soft tissue atrophy, skin depigmentation, allergic reaction to medications and vasovagal response. (steroid flare treatment is rest, ice, NSAIDs and resolves in 24-36 hours.)    Consent:  No absolute contraindications (cellulitis overlying joint, infection, lack of informed consent, allergy to injection medication, AVN protein or egg allergy for sodium hyaluronate, or history of steroid flare) or relative contraindications (uncontrolled DM2 A1c>10, coagulopathy, INR > 3.5, previous joint  "replacement or history of AVN).        Description:  The patient was prepped in normal sterile fashion use of chlorhexidine scrub and the appropriate and anatomic landmarks were identified around the KNEE with ultrasound as image guidance. The patient was evaluated with a [employer] ultrasound machine using a 10 MHz linear probe, following a standard protocol. Ultrasound imaging confirmed placement of the needle in the correct position, with reference to surrounding anatomic structures. A therapeutic and diagnostic injection was targeted at the suprapatellar recess. Sonographic examination of the bilateral knee was performed in real-time using a 10 MHz linear probe. Longitudinal and transverse scans were visualized, and image(s) were obtained from the anterior aspect of the bilateral knee.  Dynamic visualization of the needle was continuous throughout the procedure(s) and maintained good position. The ultrasound image for needle placement was captured and saved in the patient's medical record.  The joint capsule was observed to expand under ultrasound. Care was taken to ensure there was unrestricted flow of syringe contents (listed below) into the site of injection.  US Findings: moderate effusion seen in the superior patellar recess Joint space narrowing in the medial joint space with signs of meniscal damage Joint space narrowing in the lateral joint space with signs meniscal damage. A total of 35cc of clear yellow fluid was aspirated from the left joint with a 18 gauge 1.5" needle.       Indication for use of Ultrasound Guidance (elevated BMI): The indication for the use of ultrasound was to ensure accurate injection due to soft tissue plethora, and to ensure accurate localization of needle for aspiration and/or injection, and minimize risk of damage to surrounding structures. Cassie EL, Richard S, Bala LJ, Jason BJ. Improving injection accuracy of the elbow, knee, and shoulder: does injection site and imaging make " a difference? A systematic review. Am J Sports Med. 2011 Mar;39(3):656-62. doi: 10.1177/6723979738290042. Epub 2011 Jan 21. PMID: 51733991.    Body mass index is 33.68 kg/m².    Contents of syringe included: 5mL of 1% of lidocaine     Post Procedure: Patient alert, and moving all extremities. ROM improved, pain decreased.  Good peripheral pulses, no signs of vascular compromise and range of motion intact.  Aftercare instructions were given to patient at time of discharge.  Relative rest for 3 days-avoiding excess activity.  Place ice on the area for 15 minutes every 4-6 hours. Patient may take Tylenol a 1000 mg b.i.d. or ibuprofen 600 mg t.i.d. for the next 3-4 days if not on medication already and safe to take pending co-morbidities.  Protect the area for the next 1-8 hours if anesthetic was used.  Avoid excessive activity for the next 3-4 weeks.  ER precautions given for fever, severe joint pain or allergic reaction or other new symptoms related to the joint injection.        This note is dictated using the M*Modal Fluency Direct word recognition program. There are word recognition mistakes that are occasionally missed on review.     Luna Valle MD  Sports Medicine Fellow

## 2025-07-11 LAB
ANA SER QL HEP2 SUBST: NORMAL
CRYSTAL, BODY FLUID (OHS): NORMAL
CRYSTAL, BODY FLUID TYPE (OHS): NORMAL
SLIDE CRYSTALS (OHS): NORMAL

## 2025-07-13 LAB
BACTERIA SPEC ANAEROBE CULT: NORMAL
CYCLIC CITRULLINATED PEPTIDE (CCP) (OHS): 1.4 U/ML
RHEUMATOID FACTOR IGA QUANTITATIVE (OLG): 30 IU/ML
RHEUMATOID FACTOR IGM QUANTITATIVE (OLG): 56 IU/ML

## 2025-07-15 LAB — BACTERIA FLD CULT: NORMAL

## 2025-07-15 NOTE — PROGRESS NOTES
Faculty Attestation: Annamarie Blackmon  was seen in Sports Medicine Clinic Patient seen and evaluated at the time of the visit. History of Present Illness, Physical Exam, and Assessment and Plan reviewed.     Treatment plan is reasonable and appropriate. Compliance with treatment recommendations is important.      No imaging studies were done today.     Procedure note reviewed. Present for entire procedure with the fellow. Patient tolerated procedure well.     Ruben Lujan MD  Sports Medicine

## 2025-07-29 ENCOUNTER — OFFICE VISIT (OUTPATIENT)
Dept: OPHTHALMOLOGY | Facility: CLINIC | Age: 60
End: 2025-07-29
Payer: MEDICAID

## 2025-07-29 VITALS — BODY MASS INDEX: 32.44 KG/M2 | HEIGHT: 64 IN | WEIGHT: 190 LBS

## 2025-07-29 DIAGNOSIS — H25.13 NUCLEAR SCLEROSIS OF BOTH EYES: ICD-10-CM

## 2025-07-29 DIAGNOSIS — H04.123 DRY EYE SYNDROME OF BOTH EYES: ICD-10-CM

## 2025-07-29 DIAGNOSIS — H40.1112 PRIMARY OPEN ANGLE GLAUCOMA (POAG) OF RIGHT EYE, MODERATE STAGE: Primary | ICD-10-CM

## 2025-07-29 DIAGNOSIS — H40.012 OPEN ANGLE WITH BORDERLINE FINDINGS OF LEFT EYE: ICD-10-CM

## 2025-07-29 PROCEDURE — 99213 OFFICE O/P EST LOW 20 MIN: CPT | Mod: PBBFAC,PN

## 2025-07-29 RX ORDER — BRIMONIDINE TARTRATE AND TIMOLOL MALEATE 2; 5 MG/ML; MG/ML
1 SOLUTION OPHTHALMIC 2 TIMES DAILY
COMMUNITY

## 2025-07-29 NOTE — PROGRESS NOTES
HPI     OHTN OD/ POAG OS     Additional comments: Dorzolamide BID            Comments    Here for RC/IOP OU.  - No new complaints at present time.           Last edited by Mahnaz Jennings LPN on 7/29/2025  2:24 PM.            Assessment /Plan     For exam results, see Encounter Report.    Primary open angle glaucoma (POAG) of right eye, moderate stage    Open angle with borderline findings of left eye    Dry eye syndrome of both eyes    Nuclear sclerosis of both eyes          OCT RNFL 04/23/24  OD: 95 all green  OS: 112 white S rest green    OCT RNFL 5/30/25  OD: 90, Yellow I, rest green  OS: 107, White S, rest green      HVF 24-2  04/23/24  OD: FL 0/10, 3% FP, 0% FN, reliable with ?SNS  OS: FL 3/11, 12% FP, 0% FN, mostly reliable and full   7/7/2025  OD: Reliable, Possible early superior nasal step  OS: Reliable, full      OCT mac   04/23/24: NFC no SRF/IRF OU      POAG moderate OD // open angle with borderline findings OS  Steroid responder  - Referred from [facility], was followed as low risk glaucoma suspect  - Gonio 7/1/24: Open to SS   - CCT 7/1/24: 518//545 (pt with hx of LASIK OD)  - S/p SLT OU in 2021  - Tmax 30 // 25 (on combigan, latanoprost)  - Goal 21 //14 based on Tmax  - CDR 0.5 // 0.4 with healthy rim OU  - OCT RNFL all green OD // white S rest green OS   - HVF full with ?SNS OD // borderline reliable and full OS  - New correlating thinning on OCT 5/30/25  - Of note, patient reports eye pain with fluorescein..   - 11/27/24: On combigan and latanoprost, IOP 20//17, continue drops  - 5/30/25: IOP stable, possible new thinning inf OS on OCT RNFL. Exam/CD stable. Given possible SNS on last VF and inf thinning on RNFL today, will add Dorzolamide BID.   - Dorzolamide BID   - Pt follows w/ Dr Phan and would like to just see 1 ophthalmologist., OK to follow at [facility] only for now   7/7/2025:The patient presented for a HVF only. The still has early superior nasal step changes OD.  The left HVF is  still full.  - 07/29/25: IOP 22//20, given Tmax of 30 on drop previously near goal of 21//14.  On Dorzolamide BID, Combigan BID, Latanoprost QHS. Discussed drop compliance and avoidance of steroids as patient reports a history of being a steroid responder diagnosed by Dr. Phan, getting intraarticular knee injections of steroids leading to pressure spikes. Given IOP above goal and thinning with peripheral field changes in right eye, will repeat SLT in right eye (last in 2021). Return in six to eight weeks for SLT OD. Discussed with patient    3. 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 Phan in 2018  - on refresh drops PRN, doing well, K's clear  - monitor    4. Combined form cataracts OU  - NVS  - monitor    RTC 6-8 weeks procedure day for IOP check, SLT OD

## 2025-07-30 ENCOUNTER — TELEPHONE (OUTPATIENT)
Dept: ORTHOPEDICS | Facility: CLINIC | Age: 60
End: 2025-07-30
Payer: MEDICAID

## 2025-07-30 NOTE — TELEPHONE ENCOUNTER
PA Request sent to [employer] for auth of Left knee orthovisc. Will scan into chart and update when notified. Thanks.

## 2025-07-30 NOTE — TELEPHONE ENCOUNTER
----- Message from Luna Valle sent at 7/29/2025 12:46 AM CDT -----  Regarding: [employer] PA  Please obtain prior authorization for viscosupplementation. After obtaining, schedule a patient for procedure only appointment as appropriate with Dr. Valle (Euflexxa/Orthovisc/Hyalgan/Synvisc 1 visit per week for 3 weeks or Durolane 1 visit)     Laterality: Right / Left / Bilateral: Left  Estimated Return to Clinic: After PA approval    Thank you,  Dr. Valle

## 2025-08-01 ENCOUNTER — TELEPHONE (OUTPATIENT)
Dept: ORTHOPEDICS | Facility: CLINIC | Age: 60
End: 2025-08-01
Payer: MEDICAID

## 2025-08-01 NOTE — TELEPHONE ENCOUNTER
----- Message from Luna Valle MD sent at 7/29/2025 12:46 AM CDT -----  Regarding: [employer] PA  Please obtain prior authorization for viscosupplementation. After obtaining, schedule a patient for procedure only appointment as appropriate with Dr. Valle (Euflexxa/Orthovisc/Hyalgan/Synvisc 1 visit per week for 3 weeks or Durolane 1 visit)     Laterality: Right / Left / Bilateral: Left  Estimated Return to Clinic: After PA approval    Thank you,  Dr. Valle

## 2025-08-01 NOTE — TELEPHONE ENCOUNTER
Patient approved for left knee Euflexxa Auth # [id number] Good from 8/27/25-9/27/25 Please schedule with Dr Lujan. Thanks.

## 2025-08-06 NOTE — TELEPHONE ENCOUNTER
WE received a denial letter for this patient's Orthovisc now. Will advise provider Will need Peer to Peer.